# Patient Record
Sex: FEMALE | Race: WHITE | NOT HISPANIC OR LATINO | Employment: UNEMPLOYED | URBAN - NONMETROPOLITAN AREA
[De-identification: names, ages, dates, MRNs, and addresses within clinical notes are randomized per-mention and may not be internally consistent; named-entity substitution may affect disease eponyms.]

---

## 2017-07-10 LAB
EXTERNAL ABO GROUPING: NORMAL
EXTERNAL ANTIBODY SCREEN: NEGATIVE
EXTERNAL CHLAMYDIA SCREEN: NEGATIVE
EXTERNAL GONORRHEA SCREEN: NEGATIVE
EXTERNAL HEMATOCRIT: 33 %
EXTERNAL HEMOGLOBIN: 11.1 G/DL
EXTERNAL HEPATITIS B SURFACE ANTIGEN: NEGATIVE
EXTERNAL RH FACTOR: POSITIVE
EXTERNAL RUBELLA QUALITATIVE: NORMAL
EXTERNAL SYPHILIS RPR SCREEN: NORMAL
HIV1 P24 AG SERPL QL IA: NEGATIVE

## 2017-11-06 ENCOUNTER — HOSPITAL ENCOUNTER (INPATIENT)
Facility: HOSPITAL | Age: 27
LOS: 2 days | Discharge: HOME | End: 2017-11-08
Attending: OBSTETRICS & GYNECOLOGY | Admitting: OBSTETRICS & GYNECOLOGY

## 2017-11-06 DIAGNOSIS — Z34.93 PREGNANT AND NOT YET DELIVERED IN THIRD TRIMESTER: Primary | ICD-10-CM

## 2017-11-06 LAB
6-ACETYL MORPHINE: NEGATIVE
ABO GROUP BLD: NORMAL
AMPHET+METHAMPHET UR QL: NEGATIVE
BARBITURATES UR QL SCN: NEGATIVE
BENZODIAZ UR QL SCN: NEGATIVE
BLD GP AB SCN SERPL QL: NEGATIVE
BUPRENORPHINE SERPL-MCNC: POSITIVE NG/ML
CANNABINOIDS SERPL QL: NEGATIVE
COCAINE UR QL: NEGATIVE
DEPRECATED RDW RBC AUTO: 47.6 FL (ref 37–54)
ERYTHROCYTE [DISTWIDTH] IN BLOOD BY AUTOMATED COUNT: 15 % (ref 11.5–14.5)
EXTERNAL GROUP B STREP ANTIGEN: NORMAL
HCT VFR BLD AUTO: 32.2 % (ref 37–47)
HGB BLD-MCNC: 10.8 G/DL (ref 12–16)
MCH RBC QN AUTO: 30.2 PG (ref 27–33)
MCHC RBC AUTO-ENTMCNC: 33.5 G/DL (ref 33–37)
MCV RBC AUTO: 89.9 FL (ref 80–94)
METHADONE UR QL SCN: NEGATIVE
OPIATES UR QL: NEGATIVE
OXYCODONE UR QL SCN: NEGATIVE
PCP UR QL SCN: NEGATIVE
PLATELET # BLD AUTO: 363 10*3/MM3 (ref 130–400)
PMV BLD AUTO: 9.5 FL (ref 6–10)
RBC # BLD AUTO: 3.58 10*6/MM3 (ref 4.2–5.4)
RH BLD: POSITIVE
WBC NRBC COR # BLD: 13.21 10*3/MM3 (ref 4.5–12.5)

## 2017-11-06 PROCEDURE — 80307 DRUG TEST PRSMV CHEM ANLYZR: CPT | Performed by: OBSTETRICS & GYNECOLOGY

## 2017-11-06 PROCEDURE — 86850 RBC ANTIBODY SCREEN: CPT | Performed by: OBSTETRICS & GYNECOLOGY

## 2017-11-06 PROCEDURE — 86901 BLOOD TYPING SEROLOGIC RH(D): CPT | Performed by: OBSTETRICS & GYNECOLOGY

## 2017-11-06 PROCEDURE — G0463 HOSPITAL OUTPT CLINIC VISIT: HCPCS

## 2017-11-06 PROCEDURE — 59025 FETAL NON-STRESS TEST: CPT

## 2017-11-06 PROCEDURE — 86900 BLOOD TYPING SEROLOGIC ABO: CPT | Performed by: OBSTETRICS & GYNECOLOGY

## 2017-11-06 PROCEDURE — 25010000002 BUTORPHANOL PER 1 MG: Performed by: OBSTETRICS & GYNECOLOGY

## 2017-11-06 PROCEDURE — 25010000002 VANCOMYCIN PER 500 MG: Performed by: OBSTETRICS & GYNECOLOGY

## 2017-11-06 PROCEDURE — 85027 COMPLETE CBC AUTOMATED: CPT | Performed by: OBSTETRICS & GYNECOLOGY

## 2017-11-06 RX ORDER — BISACODYL 10 MG
10 SUPPOSITORY, RECTAL RECTAL DAILY PRN
Status: DISCONTINUED | OUTPATIENT
Start: 2017-11-07 | End: 2017-11-06

## 2017-11-06 RX ORDER — BUPRENORPHINE 2 MG/1
12 TABLET SUBLINGUAL DAILY
Status: CANCELLED | OUTPATIENT
Start: 2017-11-06

## 2017-11-06 RX ORDER — PRENATAL VIT NO.126/IRON/FOLIC 28MG-0.8MG
1 TABLET ORAL DAILY
COMMUNITY

## 2017-11-06 RX ORDER — ONDANSETRON 2 MG/ML
4 INJECTION INTRAMUSCULAR; INTRAVENOUS EVERY 6 HOURS PRN
Status: DISCONTINUED | OUTPATIENT
Start: 2017-11-06 | End: 2017-11-06

## 2017-11-06 RX ORDER — HYDROCODONE BITARTRATE AND ACETAMINOPHEN 5; 325 MG/1; MG/1
1 TABLET ORAL EVERY 4 HOURS PRN
Status: DISCONTINUED | OUTPATIENT
Start: 2017-11-06 | End: 2017-11-06 | Stop reason: SDUPTHER

## 2017-11-06 RX ORDER — HYDROCODONE BITARTRATE AND ACETAMINOPHEN 5; 325 MG/1; MG/1
1 TABLET ORAL EVERY 4 HOURS PRN
Status: DISCONTINUED | OUTPATIENT
Start: 2017-11-06 | End: 2017-11-08 | Stop reason: HOSPADM

## 2017-11-06 RX ORDER — IBUPROFEN 800 MG/1
800 TABLET ORAL EVERY 8 HOURS SCHEDULED
Status: DISCONTINUED | OUTPATIENT
Start: 2017-11-06 | End: 2017-11-06 | Stop reason: HOSPADM

## 2017-11-06 RX ORDER — ONDANSETRON 4 MG/1
4 TABLET, ORALLY DISINTEGRATING ORAL EVERY 6 HOURS PRN
Status: DISCONTINUED | OUTPATIENT
Start: 2017-11-06 | End: 2017-11-06 | Stop reason: HOSPADM

## 2017-11-06 RX ORDER — DOCUSATE SODIUM 100 MG/1
100 CAPSULE, LIQUID FILLED ORAL 2 TIMES DAILY
Status: DISCONTINUED | OUTPATIENT
Start: 2017-11-06 | End: 2017-11-06

## 2017-11-06 RX ORDER — SODIUM CHLORIDE 0.9 % (FLUSH) 0.9 %
1-10 SYRINGE (ML) INJECTION AS NEEDED
Status: DISCONTINUED | OUTPATIENT
Start: 2017-11-06 | End: 2017-11-08 | Stop reason: HOSPADM

## 2017-11-06 RX ORDER — LANOLIN 100 %
OINTMENT (GRAM) TOPICAL
Status: DISCONTINUED | OUTPATIENT
Start: 2017-11-06 | End: 2017-11-08 | Stop reason: HOSPADM

## 2017-11-06 RX ORDER — ONDANSETRON 2 MG/ML
4 INJECTION INTRAMUSCULAR; INTRAVENOUS EVERY 6 HOURS PRN
Status: DISCONTINUED | OUTPATIENT
Start: 2017-11-06 | End: 2017-11-06 | Stop reason: HOSPADM

## 2017-11-06 RX ORDER — ONDANSETRON 4 MG/1
4 TABLET, FILM COATED ORAL EVERY 6 HOURS PRN
Status: DISCONTINUED | OUTPATIENT
Start: 2017-11-06 | End: 2017-11-06 | Stop reason: HOSPADM

## 2017-11-06 RX ORDER — BUTORPHANOL TARTRATE 1 MG/ML
1 INJECTION, SOLUTION INTRAMUSCULAR; INTRAVENOUS
Status: DISCONTINUED | OUTPATIENT
Start: 2017-11-06 | End: 2017-11-06 | Stop reason: HOSPADM

## 2017-11-06 RX ORDER — PROMETHAZINE HYDROCHLORIDE 25 MG/1
12.5 TABLET ORAL EVERY 6 HOURS PRN
Status: DISCONTINUED | OUTPATIENT
Start: 2017-11-06 | End: 2017-11-06 | Stop reason: HOSPADM

## 2017-11-06 RX ORDER — TERBUTALINE SULFATE 1 MG/ML
0.25 INJECTION, SOLUTION SUBCUTANEOUS AS NEEDED
Status: DISCONTINUED | OUTPATIENT
Start: 2017-11-06 | End: 2017-11-06 | Stop reason: HOSPADM

## 2017-11-06 RX ORDER — ACETAMINOPHEN 325 MG/1
650 TABLET ORAL EVERY 4 HOURS PRN
Status: DISCONTINUED | OUTPATIENT
Start: 2017-11-06 | End: 2017-11-06 | Stop reason: HOSPADM

## 2017-11-06 RX ORDER — LORATADINE 10 MG/1
10 TABLET ORAL DAILY
COMMUNITY

## 2017-11-06 RX ORDER — CARBOPROST TROMETHAMINE 250 UG/ML
250 INJECTION, SOLUTION INTRAMUSCULAR AS NEEDED
Status: DISCONTINUED | OUTPATIENT
Start: 2017-11-06 | End: 2017-11-06 | Stop reason: HOSPADM

## 2017-11-06 RX ORDER — FERROUS SULFATE 325(65) MG
325 TABLET ORAL
Status: DISCONTINUED | OUTPATIENT
Start: 2017-11-07 | End: 2017-11-08 | Stop reason: HOSPADM

## 2017-11-06 RX ORDER — SODIUM CHLORIDE 0.9 % (FLUSH) 0.9 %
1-10 SYRINGE (ML) INJECTION AS NEEDED
Status: DISCONTINUED | OUTPATIENT
Start: 2017-11-06 | End: 2017-11-06

## 2017-11-06 RX ORDER — FERROUS SULFATE 325(65) MG
325 TABLET ORAL
Status: CANCELLED | OUTPATIENT
Start: 2017-11-06

## 2017-11-06 RX ORDER — BUPRENORPHINE 2 MG/1
12 TABLET SUBLINGUAL DAILY
Status: DISCONTINUED | OUTPATIENT
Start: 2017-11-06 | End: 2017-11-08 | Stop reason: HOSPADM

## 2017-11-06 RX ORDER — ZOLPIDEM TARTRATE 5 MG/1
5 TABLET ORAL NIGHTLY PRN
Status: DISCONTINUED | OUTPATIENT
Start: 2017-11-06 | End: 2017-11-06

## 2017-11-06 RX ORDER — CETIRIZINE HYDROCHLORIDE 10 MG/1
10 TABLET ORAL DAILY
Status: CANCELLED | OUTPATIENT
Start: 2017-11-06

## 2017-11-06 RX ORDER — BUPRENORPHINE 2 MG/1
3 TABLET SUBLINGUAL DAILY
Status: ON HOLD | COMMUNITY
End: 2017-11-06 | Stop reason: SDUPTHER

## 2017-11-06 RX ORDER — ONDANSETRON 4 MG/1
4 TABLET, FILM COATED ORAL EVERY 8 HOURS PRN
Status: DISCONTINUED | OUTPATIENT
Start: 2017-11-06 | End: 2017-11-08 | Stop reason: HOSPADM

## 2017-11-06 RX ORDER — DOCUSATE SODIUM 100 MG/1
100 CAPSULE, LIQUID FILLED ORAL 2 TIMES DAILY
Status: DISCONTINUED | OUTPATIENT
Start: 2017-11-06 | End: 2017-11-08 | Stop reason: HOSPADM

## 2017-11-06 RX ORDER — SODIUM CHLORIDE 0.9 % (FLUSH) 0.9 %
1-10 SYRINGE (ML) INJECTION AS NEEDED
Status: DISCONTINUED | OUTPATIENT
Start: 2017-11-06 | End: 2017-11-06 | Stop reason: HOSPADM

## 2017-11-06 RX ORDER — PRENATAL VIT/IRON FUM/FOLIC AC 27MG-0.8MG
1 TABLET ORAL DAILY
Status: DISCONTINUED | OUTPATIENT
Start: 2017-11-07 | End: 2017-11-08 | Stop reason: HOSPADM

## 2017-11-06 RX ORDER — PRENATAL VIT/IRON FUM/FOLIC AC 27MG-0.8MG
1 TABLET ORAL DAILY
Status: CANCELLED | OUTPATIENT
Start: 2017-11-06

## 2017-11-06 RX ORDER — BUPRENORPHINE HYDROCHLORIDE 8 MG/1
12 TABLET SUBLINGUAL DAILY
COMMUNITY

## 2017-11-06 RX ORDER — MISOPROSTOL 100 UG/1
800 TABLET ORAL AS NEEDED
Status: DISCONTINUED | OUTPATIENT
Start: 2017-11-06 | End: 2017-11-06 | Stop reason: HOSPADM

## 2017-11-06 RX ORDER — BISACODYL 10 MG
10 SUPPOSITORY, RECTAL RECTAL DAILY PRN
Status: DISCONTINUED | OUTPATIENT
Start: 2017-11-07 | End: 2017-11-08 | Stop reason: HOSPADM

## 2017-11-06 RX ORDER — BUPRENORPHINE 2 MG/1
3 TABLET SUBLINGUAL DAILY
Status: CANCELLED | OUTPATIENT
Start: 2017-11-06

## 2017-11-06 RX ORDER — OXYTOCIN/RINGER'S LACTATE 20/1000 ML
125 PLASTIC BAG, INJECTION (ML) INTRAVENOUS CONTINUOUS
Status: ACTIVE | OUTPATIENT
Start: 2017-11-06 | End: 2017-11-07

## 2017-11-06 RX ORDER — CETIRIZINE HYDROCHLORIDE 10 MG/1
10 TABLET ORAL NIGHTLY
Status: DISCONTINUED | OUTPATIENT
Start: 2017-11-06 | End: 2017-11-08 | Stop reason: HOSPADM

## 2017-11-06 RX ORDER — ZOLPIDEM TARTRATE 5 MG/1
5 TABLET ORAL NIGHTLY PRN
Status: DISCONTINUED | OUTPATIENT
Start: 2017-11-06 | End: 2017-11-08 | Stop reason: HOSPADM

## 2017-11-06 RX ORDER — IBUPROFEN 800 MG/1
800 TABLET ORAL EVERY 8 HOURS SCHEDULED
Status: DISCONTINUED | OUTPATIENT
Start: 2017-11-06 | End: 2017-11-08 | Stop reason: HOSPADM

## 2017-11-06 RX ORDER — ACETAMINOPHEN 325 MG/1
650 TABLET ORAL EVERY 4 HOURS PRN
Status: DISCONTINUED | OUTPATIENT
Start: 2017-11-06 | End: 2017-11-08 | Stop reason: HOSPADM

## 2017-11-06 RX ORDER — FERROUS SULFATE TAB EC 324 MG (65 MG FE EQUIVALENT) 324 (65 FE) MG
324 TABLET DELAYED RESPONSE ORAL
COMMUNITY

## 2017-11-06 RX ORDER — PROMETHAZINE HYDROCHLORIDE 12.5 MG/1
12.5 SUPPOSITORY RECTAL EVERY 6 HOURS PRN
Status: DISCONTINUED | OUTPATIENT
Start: 2017-11-06 | End: 2017-11-06 | Stop reason: HOSPADM

## 2017-11-06 RX ORDER — METHYLERGONOVINE MALEATE 0.2 MG/ML
200 INJECTION INTRAVENOUS ONCE AS NEEDED
Status: DISCONTINUED | OUTPATIENT
Start: 2017-11-06 | End: 2017-11-06 | Stop reason: HOSPADM

## 2017-11-06 RX ORDER — MAGNESIUM HYDROXIDE 1200 MG/15ML
1000 LIQUID ORAL ONCE AS NEEDED
Status: DISCONTINUED | OUTPATIENT
Start: 2017-11-06 | End: 2017-11-06 | Stop reason: HOSPADM

## 2017-11-06 RX ORDER — PROMETHAZINE HYDROCHLORIDE 25 MG/ML
12.5 INJECTION, SOLUTION INTRAMUSCULAR; INTRAVENOUS EVERY 6 HOURS PRN
Status: DISCONTINUED | OUTPATIENT
Start: 2017-11-06 | End: 2017-11-06 | Stop reason: HOSPADM

## 2017-11-06 RX ORDER — SODIUM CHLORIDE, SODIUM LACTATE, POTASSIUM CHLORIDE, CALCIUM CHLORIDE 600; 310; 30; 20 MG/100ML; MG/100ML; MG/100ML; MG/100ML
125 INJECTION, SOLUTION INTRAVENOUS CONTINUOUS
Status: DISCONTINUED | OUTPATIENT
Start: 2017-11-06 | End: 2017-11-08 | Stop reason: HOSPADM

## 2017-11-06 RX ORDER — OXYTOCIN/RINGER'S LACTATE 20/1000 ML
2-20 PLASTIC BAG, INJECTION (ML) INTRAVENOUS
Status: DISCONTINUED | OUTPATIENT
Start: 2017-11-06 | End: 2017-11-06 | Stop reason: HOSPADM

## 2017-11-06 RX ORDER — IBUPROFEN 600 MG/1
600 TABLET ORAL EVERY 8 HOURS PRN
Status: DISCONTINUED | OUTPATIENT
Start: 2017-11-06 | End: 2017-11-06

## 2017-11-06 RX ORDER — LIDOCAINE HYDROCHLORIDE 10 MG/ML
INJECTION, SOLUTION INFILTRATION; PERINEURAL
Status: DISCONTINUED
Start: 2017-11-06 | End: 2017-11-06 | Stop reason: WASHOUT

## 2017-11-06 RX ADMIN — HYDROCODONE BITARTRATE AND ACETAMINOPHEN 1 TABLET: 5; 325 TABLET ORAL at 15:16

## 2017-11-06 RX ADMIN — BUTORPHANOL TARTRATE 2 MG: 2 INJECTION, SOLUTION INTRAMUSCULAR; INTRAVENOUS at 07:33

## 2017-11-06 RX ADMIN — BUTORPHANOL TARTRATE 2 MG: 2 INJECTION, SOLUTION INTRAMUSCULAR; INTRAVENOUS at 09:52

## 2017-11-06 RX ADMIN — IBUPROFEN 600 MG: 600 TABLET ORAL at 15:16

## 2017-11-06 RX ADMIN — IBUPROFEN 800 MG: 800 TABLET ORAL at 21:31

## 2017-11-06 RX ADMIN — OXYTOCIN 2 MILLI-UNITS/MIN: 10 INJECTION INTRAVENOUS at 08:22

## 2017-11-06 RX ADMIN — BUPRENORPHINE HCL 12 MG: 2 TABLET SUBLINGUAL at 16:46

## 2017-11-06 RX ADMIN — DOCUSATE SODIUM 100 MG: 100 CAPSULE, LIQUID FILLED ORAL at 16:45

## 2017-11-06 NOTE — PLAN OF CARE
Problem: Patient Care Overview (Adult)  Goal: Plan of Care Review  Outcome: Ongoing (interventions implemented as appropriate)    11/06/17 0731   Coping/Psychosocial Response Interventions   Plan Of Care Reviewed With patient       Goal: Adult Individualization and Mutuality  Outcome: Ongoing (interventions implemented as appropriate)    Problem: Labor (Cervical Ripen, Induct, Augment) (Adult,Obstetrics,Pediatric)  Goal: Signs and Symptoms of Listed Potential Problems Will be Absent or Manageable (Labor)  Outcome: Ongoing (interventions implemented as appropriate)

## 2017-11-06 NOTE — H&P
HISTORY    Chief complaint  Ruptured membranes, post term pregnancy    History of present illness  This is a 27 year old patient of Dr. Krishna Chavira.  She has had 2 previous term vaginal deliveries.  FOB's name is Hal.  She is positive for hepatitis C.  She does wish to have tubal sterilization.  Last prenatal visit was August 7, 2017.  She was admitted to labor and delivery November 6 at 12:29 AM.  Her complaint was leaking of fluid.  Drug screen is positive.  She is at 40 weeks +6 days.    Past medical, surgical, obstetrical history  See attached prenatal record    Family history  See attached prenatal record    Social history  See attached prenatal record    Functional inquiry  See attached prenatal record                             PHYSICAL EXAMINATION    General  In no acute distress    HEENT  Throat and ears are clear, no masses or nodes were palpable    CVR  Heart sounds are normal with no murmurs, chest is clear to IPPA    GI/  Abdomen is soft with no masses tenderness or organomegaly.  Uterine size is commensurate with her dates.  Cervix is as noted elsewhere in chart    MS  No gross bone or joint abnormalities.                                        IMPRESSION/TREATMENT PLAN  I put on a fetal scalp electrode.  She is about 3-4 cm, 50%, -2.  Anticipate spontaneous vaginal delivery.

## 2017-11-06 NOTE — NURSING NOTE
I encouraged patient to allow me to attempt IV acces and explained the need for all different types of meds involved in routine labor care. She allowed me with success also allowed me to monitor her and vag exam. Modesta Singh RN

## 2017-11-06 NOTE — PLAN OF CARE
Problem: Patient Care Overview (Adult)  Goal: Plan of Care Review  Outcome: Ongoing (interventions implemented as appropriate)    11/06/17 0105   Coping/Psychosocial Response Interventions   Plan Of Care Reviewed With patient;spouse   Patient Care Overview   Progress progress towards functional goals is fair

## 2017-11-06 NOTE — NON STRESS TEST
Chely Rodríguez, a  at Unknown with an MADIHA of Not found., was seen at [facility] LABOR DELIVERY for a nonstress test.    Chief Complaint   Patient presents with   • Leaking of fluid       Interpretation A  Nonstress Test Interpretation A: Reactive (17 : Lulu Willard, RN)  Comments A: Verified by Flaquita Gould RN (17 : Lulu Willard, KARLY)

## 2017-11-06 NOTE — PLAN OF CARE
Problem: Patient Care Overview (Adult)  Goal: Adult Individualization and Mutuality  Outcome: Ongoing (interventions implemented as appropriate)    11/06/17 0105   Mutuality/Individual Preferences   What Information Would Help Us Give You More Personalized Care? pt has had a lack of prenatal care and is post dates   Individualization   Patient Specific Interventions IVF's, continuous monitoring, antibiotics for unknown GBS with PCN allergy

## 2017-11-06 NOTE — PLAN OF CARE
Problem: Labor (Cervical Ripen, Induct, Augment) (Adult,Obstetrics,Pediatric)  Goal: Signs and Symptoms of Listed Potential Problems Will be Absent or Manageable (Labor)  Outcome: Ongoing (interventions implemented as appropriate)    11/06/17 0104   Labor (Cervical Ripen, Induct, Augment)   Problems Assessed (Labor) all   Problems Present (Labor) pain

## 2017-11-06 NOTE — L&D DELIVERY NOTE
Vaginal Delivery Procedure Note    Chely Rodríguez  Gestational Age: <None> .        OBGYN: Dejuan Ward MD      Pre-op Diagnosis: Complete Dilation      Anesthesia: Epidual        Detailed Description of Procedure   This patient came to the hospital with a 40 week +6 day pregnancy, with ruptured membranes.  She was 4 cm.  We applied a fetal scalp electrode, and she went on to deliver a healthy baby over an intact perineum.  The placenta delivered intact. Father, mother, and baby are all stable in the birthing room at this time.         Maternal Blood Type: A   Amniotic Fluid Clear  Blood Cord: Yes  Estimated Blood Loss: * No surgery found *  Placenta: Spontaneous, Delivered Intact   Uterus Explored: Yes    Disposition: Transfer to Women's Health Floor  Condition: Stable    Dejuan Ward M.D     Date: 11/6/2017  Time: 12:25 PM

## 2017-11-06 NOTE — NURSING NOTE
Patient refusing efm at this time and continues to refuse IV risk and benefits explained again patient verbalizes understanding

## 2017-11-06 NOTE — NURSING NOTE
"Patient off of efm and toco so she can walk around and use the birthing ball, patient said she would \"think about\" having a power glide inserted today for IV access.   "

## 2017-11-06 NOTE — NURSING NOTE
Patient refusing IV at this time, Flaquita Gould RN witnessed conversation, risks and benefits explained to patient of need for IV for fluids, antibiotics for unknown GBS status, and prolonged ROM, patient continues to refuse IV at this time, Dr. Newberry aware

## 2017-11-07 LAB
BASOPHILS # BLD AUTO: 0.02 10*3/MM3 (ref 0–0.3)
BASOPHILS NFR BLD AUTO: 0.1 % (ref 0–2)
DEPRECATED RDW RBC AUTO: 48.1 FL (ref 37–54)
EOSINOPHIL # BLD AUTO: 0.1 10*3/MM3 (ref 0–0.7)
EOSINOPHIL NFR BLD AUTO: 0.6 % (ref 0–5)
ERYTHROCYTE [DISTWIDTH] IN BLOOD BY AUTOMATED COUNT: 15 % (ref 11.5–14.5)
HCT VFR BLD AUTO: 29.5 % (ref 37–47)
HGB BLD-MCNC: 9.6 G/DL (ref 12–16)
IMM GRANULOCYTES # BLD: 0.05 10*3/MM3 (ref 0–0.03)
IMM GRANULOCYTES NFR BLD: 0.3 % (ref 0–0.5)
LYMPHOCYTES # BLD AUTO: 4.85 10*3/MM3 (ref 1–3)
LYMPHOCYTES NFR BLD AUTO: 29.5 % (ref 21–51)
MCH RBC QN AUTO: 28.9 PG (ref 27–33)
MCHC RBC AUTO-ENTMCNC: 32.5 G/DL (ref 33–37)
MCV RBC AUTO: 88.9 FL (ref 80–94)
MONOCYTES # BLD AUTO: 1.41 10*3/MM3 (ref 0.1–0.9)
MONOCYTES NFR BLD AUTO: 8.6 % (ref 0–10)
NEUTROPHILS # BLD AUTO: 10.02 10*3/MM3 (ref 1.4–6.5)
NEUTROPHILS NFR BLD AUTO: 60.9 % (ref 30–70)
PLATELET # BLD AUTO: 341 10*3/MM3 (ref 130–400)
PMV BLD AUTO: 9.7 FL (ref 6–10)
RBC # BLD AUTO: 3.32 10*6/MM3 (ref 4.2–5.4)
WBC NRBC COR # BLD: 16.45 10*3/MM3 (ref 4.5–12.5)

## 2017-11-07 PROCEDURE — 85025 COMPLETE CBC W/AUTO DIFF WBC: CPT | Performed by: OBSTETRICS & GYNECOLOGY

## 2017-11-07 RX ADMIN — FERROUS SULFATE TAB 325 MG (65 MG ELEMENTAL FE) 325 MG: 325 (65 FE) TAB at 09:01

## 2017-11-07 RX ADMIN — PRENATAL VIT W/ FE FUMARATE-FA TAB 27-0.8 MG 1 TABLET: 27-0.8 TAB at 09:01

## 2017-11-07 RX ADMIN — IBUPROFEN 800 MG: 800 TABLET ORAL at 13:41

## 2017-11-07 RX ADMIN — IBUPROFEN 800 MG: 800 TABLET ORAL at 21:33

## 2017-11-07 RX ADMIN — IBUPROFEN 800 MG: 800 TABLET ORAL at 06:33

## 2017-11-07 RX ADMIN — DOCUSATE SODIUM 100 MG: 100 CAPSULE, LIQUID FILLED ORAL at 09:01

## 2017-11-07 RX ADMIN — BUPRENORPHINE HCL 12 MG: 2 TABLET SUBLINGUAL at 09:01

## 2017-11-07 RX ADMIN — DOCUSATE SODIUM 100 MG: 100 CAPSULE, LIQUID FILLED ORAL at 17:35

## 2017-11-07 NOTE — PLAN OF CARE
Problem: Patient Care Overview (Adult)  Goal: Plan of Care Review  Outcome: Ongoing (interventions implemented as appropriate)    11/06/17 0105 11/06/17 2000   Coping/Psychosocial Response Interventions   Plan Of Care Reviewed With --  patient   Patient Care Overview   Progress progress towards functional goals is fair --        Goal: Adult Individualization and Mutuality  Outcome: Ongoing (interventions implemented as appropriate)    Problem: Postpartum, Vaginal Delivery (Adult)  Goal: Signs and Symptoms of Listed Potential Problems Will be Absent or Manageable (Postpartum, Vaginal Delivery)  Outcome: Ongoing (interventions implemented as appropriate)    11/07/17 0633   Postpartum, Vaginal Delivery   Problems Assessed (Postpartum Vaginal Delivery) all   Problems Present (Postpartum Vaginal Delivery) none

## 2017-11-07 NOTE — PROGRESS NOTES
" [facility]  Vaginal Delivery Progress Note    Subjective   Subjective  Postpartum Day 1: Vaginal Delivery    The patient feels well.  Her pain is well controlled with nonsteroidal anti-inflammatory drugs.   She is ambulating well.  Patient describes her bleeding as moderate lochia.    Breastfeeding: declines.    Objective     Objective   Vital Signs Range for the last 24 hours  Temperature: Temp:  [97.3 °F (36.3 °C)-98.2 °F (36.8 °C)] 98 °F (36.7 °C)   Temp Source: Temp src: Oral   BP: BP: (110-142)/(47-85) 115/66   Pulse: Heart Rate:  [66-98] 98   Respirations: Resp:  [18] 18   Weight:       Admit Height:  Height: 60\" (152.4 cm)    Physical Exam:  General:  no acute distresss.  Abdomen: Fundus: appropriate, firm, non tender  Extremities: normal, atraumatic, no cyanosis, and trace edema.       [unfilled]       Lab Results   Component Value Date    ABO A 11/06/2017    RH Positive 11/06/2017        Lab Results   Component Value Date    HGB 9.6 (L) 11/07/2017    HCT 29.5 (L) 11/07/2017         Assessment/Plan   Assessment & Plan  Active Problems:    Pregnant and not yet delivered in third trimester      Chely Rodríguez is Day 1  post-partum  Vaginal, Spontaneous Delivery    .      Plan:  Continue current care.      Sharron Shelton, APRN  11/7/2017  8:45 AM    "

## 2017-11-07 NOTE — PROGRESS NOTES
"Case Management/Social Work    Patient Name:  Chely Rodríguez  YOB: 1990  MRN: [id number]  Admit Date:  11/6/2017    [facility] received consult \"mother hx drugs and in subutex program.\" Pt is 26 Y/O Chely Blanco who delivered viable baby girl on 11/6/17 weighing 7 lbs. 9.5 oz. Infant was named Margarita Charles \"Immanuel.\" FOB is Hal Gambino who is involved. Pt has two other children; Agapito Ramirez, age 6 and Trae Rodríguez, age 4. Pt states she has custody of both children. Pt lives at [address] [place] [address] in [place], [place]. Pt lives in the home with FOB and her children. Pt utilizes WIC and SNAP benefits. Pt does not utilize the HANDS Program. Pt to sign infant up to receive Medicaid. Infant care supplies available including the car seat.     Pt's UDS is positive for Buprenorphine. Infant's UDS is negative. Pt had a positive UDS on 7/10/17 and 8/7/17 for Methamphetamine and Buprenorphine. Pt was prescribed Buprenorphine by Dr. Earl at [facility] from 6/27/17 through 10/19/17. Pt denies using Methamphetamine and states it was Claritin-D. Infant's CordStat results are pending.     Pt had two prenatal visits; 7/10/17 and 8/7/17. Pt states she did not know she was pregnant until July and states she did not have transportation to her prenatal appointments.     [facility] contacted Central Intake per Melina and report was accepted for investigation. Intake ID# [id number]. [facility] to provide infant's discharge plan when available.     [facility] to provide transportation at discharge.     [facility] to be contacted with positive CordStat results or any other issues or concerns.     Electronically signed by:  Idania Maldonado  11/07/17 12:51 PM  "

## 2017-11-08 VITALS
HEIGHT: 60 IN | HEART RATE: 67 BPM | DIASTOLIC BLOOD PRESSURE: 75 MMHG | RESPIRATION RATE: 18 BRPM | OXYGEN SATURATION: 96 % | WEIGHT: 181 LBS | TEMPERATURE: 97.8 F | BODY MASS INDEX: 35.53 KG/M2 | SYSTOLIC BLOOD PRESSURE: 124 MMHG

## 2017-11-08 PROBLEM — Z34.93 PREGNANT AND NOT YET DELIVERED IN THIRD TRIMESTER: Status: RESOLVED | Noted: 2017-11-06 | Resolved: 2017-11-08

## 2017-11-08 RX ORDER — IBUPROFEN 800 MG/1
800 TABLET ORAL EVERY 8 HOURS SCHEDULED
Qty: 40 TABLET | Refills: 1 | Status: SHIPPED | OUTPATIENT
Start: 2017-11-08

## 2017-11-08 RX ADMIN — DOCUSATE SODIUM 100 MG: 100 CAPSULE, LIQUID FILLED ORAL at 08:27

## 2017-11-08 RX ADMIN — FERROUS SULFATE TAB 325 MG (65 MG ELEMENTAL FE) 325 MG: 325 (65 FE) TAB at 08:27

## 2017-11-08 RX ADMIN — BUPRENORPHINE HCL 12 MG: 2 TABLET SUBLINGUAL at 08:27

## 2017-11-08 RX ADMIN — IBUPROFEN 800 MG: 800 TABLET ORAL at 05:50

## 2017-11-08 RX ADMIN — PRENATAL VIT W/ FE FUMARATE-FA TAB 27-0.8 MG 1 TABLET: 27-0.8 TAB at 08:27

## 2017-11-08 RX ADMIN — HYDROCODONE BITARTRATE AND ACETAMINOPHEN 1 TABLET: 5; 325 TABLET ORAL at 00:36

## 2017-11-08 NOTE — PLAN OF CARE
Problem: Patient Care Overview (Adult)  Goal: Plan of Care Review  Outcome: Ongoing (interventions implemented as appropriate)    11/07/17 1134 11/07/17 2000   Coping/Psychosocial Response Interventions   Plan Of Care Reviewed With --  patient   Patient Care Overview   Progress improving --        Goal: Adult Individualization and Mutuality  Outcome: Ongoing (interventions implemented as appropriate)    Problem: Postpartum, Vaginal Delivery (Adult)  Goal: Signs and Symptoms of Listed Potential Problems Will be Absent or Manageable (Postpartum, Vaginal Delivery)  Outcome: Ongoing (interventions implemented as appropriate)    11/07/17 0633   Postpartum, Vaginal Delivery   Problems Assessed (Postpartum Vaginal Delivery) all   Problems Present (Postpartum Vaginal Delivery) none

## 2017-11-08 NOTE — PLAN OF CARE
Plan of Care Review Ongoing (interventions implemented as appropriate)      Adult Individualization and Mutuality Ongoing (interventions implemented as appropriate)      Discharge Needs Assessment Ongoing (interventions implemented as appropriate)      Signs and Symptoms of Listed Potential Problems Will be Absent or Manageable (Postpartum, Vaginal Delivery) Ongoing (interventions implemented as appropriate)

## 2017-11-08 NOTE — PROGRESS NOTES
" [facility]  Vaginal Delivery Progress Note    Subjective   Subjective  Postpartum Day 2: Vaginal Delivery    The patient feels well.  Her pain is well controlled with nonsteroidal anti-inflammatory drugs.   She is ambulating well.  Patient describes her bleeding as moderate lochia.    Breastfeeding: declines.    Objective     Objective   Vital Signs Range for the last 24 hours  Temperature: Temp:  [97.8 °F (36.6 °C)-97.9 °F (36.6 °C)] 97.8 °F (36.6 °C)   Temp Source: Temp src: Oral   BP: BP: (124-126)/(75-82) 124/75   Pulse: Heart Rate:  [67-82] 67   Respirations: Resp:  [18] 18   Weight:       Admit Height:  Height: 60\" (152.4 cm)    Physical Exam:  General:  no acute distresss.  Abdomen: Fundus: appropriate, firm, non tender  Extremities: normal, atraumatic, no cyanosis, and trace edema.       [unfilled]       Lab Results   Component Value Date    ABO A 11/06/2017    RH Positive 11/06/2017        Lab Results   Component Value Date    HGB 9.6 (L) 11/07/2017    HCT 29.5 (L) 11/07/2017         Assessment/Plan   Assessment & Plan  Active Problems:    Postpartum care following vaginal delivery      Chely Rodríguez is Day 2  post-partum  Vaginal, Spontaneous Delivery    .      Plan:  Continue current care. Baby on 5 day hold.  D/C home.      STUART Vyas  11/8/2017  9:49 AM    "

## 2017-11-08 NOTE — PAYOR COMM NOTE
"CONTACT:  EDY DELGADO RN, BSN  UTILIZATION MANAGEMENT DEPT.  [facility]  [address], [address]  PHONE:  [phone]  FAX: [phone]    PT D/C'D HOME 11/8/17. REFER TO AUTH # [id number]    Rubens Rodríguez (27 y.o. Female)     Date of Birth Social Security Number Address Home Phone MRN    1990  [id number] [address] [phone] [id number]    [employer] Marital Status          Unknown Single       Admission Date Admission Type Admitting Provider Attending Provider Department, Room/Bed    11/6/17 Elective Nandini Newberry,   [facility], W237/1    Discharge Date Discharge Disposition Discharge Destination        11/8/2017 Home or Self Care             Attending Provider: (none)    Allergies:  Bee Venom, Penicillins    Isolation:  None   Infection:  None   Code Status:  FULL    Ht:  60\" (152.4 cm)   Wt:  181 lb (82.1 kg)    Admission Cmt:  None   Principal Problem:  None                Active Insurance as of 11/6/2017     Primary Coverage     Payor Plan Insurance Group Employer/Plan Group    [employer] [place] [employer] [place]ICA[place]      Payor Plan Address Payor Plan Phone Number Effective From Effective To    [address] [address] [phone] 11/6/2017     [place] [address]       Subscriber Name Subscriber Birth Date Member ID       RUBENS RODRÍGUEZ 1990 [id number]                 Emergency Contacts      (Rel.) Home Phone Work Phone Mobile Phone    Caridad Underwood (Relative) [phone] -- --               Discharge Summary      STUART Vyas at 11/8/2017  9:34 AM           [facility]  Delivery Discharge Summary    Primary OB Clinician:     EDC: Estimated Date of Delivery: 10/31/17    Gestational Age:40w6d    Antepartum complications: + UDS methamphetamine and suboxone therapy    Date of Delivery: 11/6/2017   Time of Delivery: 12:19 PM     Delivered By:  Dejuan Ward     Delivery Type: Vaginal, Spontaneous Delivery  "     Tubal Ligation: n/a    Baby:Female  Apgar:  8   @ 1 minute /   Apgar:  9   @ 5 minutes      Anesthesia: None      Intrapartum complications: None    Laceration: no    Episiotomy: No    Placenta: Spontaneous     Feeding method: Bottlefeeding    [unfilled]       Lab Results   Component Value Date    ABO A 2017    RH Positive 2017        Lab Results   Component Value Date    HGB 9.6 (L) 2017    HCT 29.5 (L) 2017       Rh Immune globulin given: not applicable      Discharge Date: 2017; Discharge Time: 9:35 AM        Plan:    Address and phone number verified and same.  Follow-up appointment with EA in 3 weeks.      STUART Vyas  2017  9:35 AM     Electronically signed by STUART Vyas at 2017  9:36 AM

## 2017-11-08 NOTE — DISCHARGE SUMMARY
JENNIE Cheng  Delivery Discharge Summary    Primary OB Clinician:     EDC: Estimated Date of Delivery: 10/31/17    Gestational Age:40w6d    Antepartum complications: + UDS methamphetamine and suboxone therapy    Date of Delivery: 2017   Time of Delivery: 12:19 PM     Delivered By:  Dejuan Ward     Delivery Type: Vaginal, Spontaneous Delivery      Tubal Ligation: n/a    Baby:Female  Apgar:  8   @ 1 minute /   Apgar:  9   @ 5 minutes      Anesthesia: None      Intrapartum complications: None    Laceration: no    Episiotomy: No    Placenta: Spontaneous     Feeding method: Bottlefeeding    [unfilled]       Lab Results   Component Value Date    ABO A 2017    RH Positive 2017        Lab Results   Component Value Date    HGB 9.6 (L) 2017    HCT 29.5 (L) 2017       Rh Immune globulin given: not applicable      Discharge Date: 2017; Discharge Time: 9:35 AM        Plan:    Address and phone number verified and same.  Follow-up appointment with EA in 3 weeks.      STUART Vyas  2017  9:35 AM

## 2019-03-25 ENCOUNTER — TRANSCRIBE ORDERS (OUTPATIENT)
Dept: ADMINISTRATIVE | Facility: HOSPITAL | Age: 29
End: 2019-03-25

## 2019-03-25 ENCOUNTER — APPOINTMENT (OUTPATIENT)
Dept: LAB | Facility: HOSPITAL | Age: 29
End: 2019-03-25

## 2019-03-25 DIAGNOSIS — N91.2 ABSENCE OF MENSTRUATION: Primary | ICD-10-CM

## 2019-03-25 PROCEDURE — 36415 COLL VENOUS BLD VENIPUNCTURE: CPT

## 2019-03-25 PROCEDURE — 84702 CHORIONIC GONADOTROPIN TEST: CPT

## 2019-03-26 LAB — HCG INTACT+B SERPL-ACNC: <0.5 MIU/ML

## 2020-01-26 ENCOUNTER — TRANSCRIBE ORDERS (OUTPATIENT)
Dept: ADMINISTRATIVE | Facility: HOSPITAL | Age: 30
End: 2020-01-26

## 2020-01-26 ENCOUNTER — HOSPITAL ENCOUNTER (OUTPATIENT)
Dept: GENERAL RADIOLOGY | Facility: HOSPITAL | Age: 30
Discharge: HOME | End: 2020-01-26

## 2020-01-26 ENCOUNTER — HOSPITAL ENCOUNTER (OUTPATIENT)
Dept: GENERAL RADIOLOGY | Facility: HOSPITAL | Age: 30
Discharge: HOME | End: 2020-01-26
Admitting: CHIROPRACTOR

## 2020-01-26 DIAGNOSIS — M25.559 ARTHRALGIA OF HIP, UNSPECIFIED LATERALITY: Primary | ICD-10-CM

## 2020-01-26 PROCEDURE — 72110 X-RAY EXAM L-2 SPINE 4/>VWS: CPT

## 2020-01-26 PROCEDURE — 72170 X-RAY EXAM OF PELVIS: CPT

## 2020-01-26 PROCEDURE — 72170 X-RAY EXAM OF PELVIS: CPT | Performed by: RADIOLOGY

## 2020-01-26 PROCEDURE — 72110 X-RAY EXAM L-2 SPINE 4/>VWS: CPT | Performed by: RADIOLOGY

## 2022-11-16 ENCOUNTER — HOSPITAL ENCOUNTER (EMERGENCY)
Facility: HOSPITAL | Age: 32
Discharge: HOME | End: 2022-11-17
Attending: STUDENT IN AN ORGANIZED HEALTH CARE EDUCATION/TRAINING PROGRAM | Admitting: STUDENT IN AN ORGANIZED HEALTH CARE EDUCATION/TRAINING PROGRAM

## 2022-11-16 ENCOUNTER — APPOINTMENT (OUTPATIENT)
Dept: CT IMAGING | Facility: HOSPITAL | Age: 32
End: 2022-11-16

## 2022-11-16 DIAGNOSIS — R10.9 CHRONIC ABDOMINAL PAIN: Primary | ICD-10-CM

## 2022-11-16 DIAGNOSIS — G89.29 CHRONIC ABDOMINAL PAIN: Primary | ICD-10-CM

## 2022-11-16 LAB
ALBUMIN SERPL-MCNC: 4.23 G/DL (ref 3.5–5.2)
ALBUMIN/GLOB SERPL: 1.4 G/DL
ALP SERPL-CCNC: 62 U/L (ref 39–117)
ALT SERPL W P-5'-P-CCNC: 42 U/L (ref 1–33)
ANION GAP SERPL CALCULATED.3IONS-SCNC: 13.2 MMOL/L (ref 5–15)
AST SERPL-CCNC: 31 U/L (ref 1–32)
BACTERIA UR QL AUTO: ABNORMAL /HPF
BASOPHILS # BLD AUTO: 0.04 10*3/MM3 (ref 0–0.2)
BASOPHILS NFR BLD AUTO: 0.4 % (ref 0–1.5)
BILIRUB SERPL-MCNC: <0.2 MG/DL (ref 0–1.2)
BILIRUB UR QL STRIP: NEGATIVE
BUN SERPL-MCNC: 10 MG/DL (ref 6–20)
BUN/CREAT SERPL: 13.3 (ref 7–25)
CALCIUM SPEC-SCNC: 9.3 MG/DL (ref 8.6–10.5)
CHLORIDE SERPL-SCNC: 106 MMOL/L (ref 98–107)
CLARITY UR: ABNORMAL
CO2 SERPL-SCNC: 21.8 MMOL/L (ref 22–29)
COLOR UR: YELLOW
CREAT SERPL-MCNC: 0.75 MG/DL (ref 0.57–1)
CRP SERPL-MCNC: <0.3 MG/DL (ref 0–0.5)
DEPRECATED RDW RBC AUTO: 42.5 FL (ref 37–54)
EGFRCR SERPLBLD CKD-EPI 2021: 107.3 ML/MIN/1.73
EOSINOPHIL # BLD AUTO: 0.11 10*3/MM3 (ref 0–0.4)
EOSINOPHIL NFR BLD AUTO: 1.2 % (ref 0.3–6.2)
ERYTHROCYTE [DISTWIDTH] IN BLOOD BY AUTOMATED COUNT: 12.4 % (ref 12.3–15.4)
ERYTHROCYTE [SEDIMENTATION RATE] IN BLOOD: 7 MM/HR (ref 0–20)
GLOBULIN UR ELPH-MCNC: 3 GM/DL
GLUCOSE SERPL-MCNC: 85 MG/DL (ref 65–99)
GLUCOSE UR STRIP-MCNC: NEGATIVE MG/DL
HCG SERPL QL: NEGATIVE
HCT VFR BLD AUTO: 42.3 % (ref 34–46.6)
HGB BLD-MCNC: 13.9 G/DL (ref 12–15.9)
HGB UR QL STRIP.AUTO: ABNORMAL
HYALINE CASTS UR QL AUTO: ABNORMAL /LPF
IMM GRANULOCYTES # BLD AUTO: 0.03 10*3/MM3 (ref 0–0.05)
IMM GRANULOCYTES NFR BLD AUTO: 0.3 % (ref 0–0.5)
KETONES UR QL STRIP: NEGATIVE
LEUKOCYTE ESTERASE UR QL STRIP.AUTO: ABNORMAL
LYMPHOCYTES # BLD AUTO: 3.31 10*3/MM3 (ref 0.7–3.1)
LYMPHOCYTES NFR BLD AUTO: 34.7 % (ref 19.6–45.3)
MCH RBC QN AUTO: 30.6 PG (ref 26.6–33)
MCHC RBC AUTO-ENTMCNC: 32.9 G/DL (ref 31.5–35.7)
MCV RBC AUTO: 93.2 FL (ref 79–97)
MONOCYTES # BLD AUTO: 0.74 10*3/MM3 (ref 0.1–0.9)
MONOCYTES NFR BLD AUTO: 7.8 % (ref 5–12)
NEUTROPHILS NFR BLD AUTO: 5.3 10*3/MM3 (ref 1.7–7)
NEUTROPHILS NFR BLD AUTO: 55.6 % (ref 42.7–76)
NITRITE UR QL STRIP: NEGATIVE
NRBC BLD AUTO-RTO: 0 /100 WBC (ref 0–0.2)
PH UR STRIP.AUTO: 7 [PH] (ref 5–8)
PLATELET # BLD AUTO: 299 10*3/MM3 (ref 140–450)
PMV BLD AUTO: 10.2 FL (ref 6–12)
POTASSIUM SERPL-SCNC: 3.9 MMOL/L (ref 3.5–5.2)
PROT SERPL-MCNC: 7.2 G/DL (ref 6–8.5)
PROT UR QL STRIP: NEGATIVE
RBC # BLD AUTO: 4.54 10*6/MM3 (ref 3.77–5.28)
RBC # UR STRIP: ABNORMAL /HPF
REF LAB TEST METHOD: ABNORMAL
SODIUM SERPL-SCNC: 141 MMOL/L (ref 136–145)
SP GR UR STRIP: 1.01 (ref 1–1.03)
SQUAMOUS #/AREA URNS HPF: ABNORMAL /HPF
UROBILINOGEN UR QL STRIP: ABNORMAL
WBC # UR STRIP: ABNORMAL /HPF
WBC NRBC COR # BLD: 9.53 10*3/MM3 (ref 3.4–10.8)
WHOLE BLOOD HOLD SPECIMEN: NORMAL

## 2022-11-16 PROCEDURE — 86850 RBC ANTIBODY SCREEN: CPT | Performed by: PHYSICIAN ASSISTANT

## 2022-11-16 PROCEDURE — 74177 CT ABD & PELVIS W/CONTRAST: CPT

## 2022-11-16 PROCEDURE — 81001 URINALYSIS AUTO W/SCOPE: CPT | Performed by: PHYSICIAN ASSISTANT

## 2022-11-16 PROCEDURE — 86900 BLOOD TYPING SEROLOGIC ABO: CPT | Performed by: PHYSICIAN ASSISTANT

## 2022-11-16 PROCEDURE — 86140 C-REACTIVE PROTEIN: CPT | Performed by: PHYSICIAN ASSISTANT

## 2022-11-16 PROCEDURE — 36415 COLL VENOUS BLD VENIPUNCTURE: CPT

## 2022-11-16 PROCEDURE — 80053 COMPREHEN METABOLIC PANEL: CPT | Performed by: PHYSICIAN ASSISTANT

## 2022-11-16 PROCEDURE — 85025 COMPLETE CBC W/AUTO DIFF WBC: CPT | Performed by: PHYSICIAN ASSISTANT

## 2022-11-16 PROCEDURE — 84703 CHORIONIC GONADOTROPIN ASSAY: CPT | Performed by: PHYSICIAN ASSISTANT

## 2022-11-16 PROCEDURE — 86901 BLOOD TYPING SEROLOGIC RH(D): CPT | Performed by: PHYSICIAN ASSISTANT

## 2022-11-16 PROCEDURE — 85652 RBC SED RATE AUTOMATED: CPT | Performed by: PHYSICIAN ASSISTANT

## 2022-11-16 PROCEDURE — 99283 EMERGENCY DEPT VISIT LOW MDM: CPT

## 2022-11-16 NOTE — ED NOTES
MEDICAL SCREENING:    Reason for Visit: Right-sided predominantly lower abdominal pain over the past 2 weeks with some blood in her stool.    Patient initially seen in triage.  The patient was advised further evaluation and diagnostic testing will be needed, some of the treatment and testing will be initiated in the lobby in order to begin the process.  The patient will be returned to the waiting area for the time being and possibly be re-assessed by a subsequent ED provider.  The patient will be brought back to the treatment area in as timely manner as possible.         Gus Jennings PA  11/16/22 1736

## 2022-11-17 VITALS
RESPIRATION RATE: 20 BRPM | WEIGHT: 245 LBS | TEMPERATURE: 98.4 F | BODY MASS INDEX: 46.26 KG/M2 | DIASTOLIC BLOOD PRESSURE: 86 MMHG | OXYGEN SATURATION: 100 % | HEIGHT: 61 IN | SYSTOLIC BLOOD PRESSURE: 93 MMHG | HEART RATE: 86 BPM

## 2022-11-17 LAB
ABO GROUP BLD: NORMAL
BLD GP AB SCN SERPL QL: NEGATIVE
HOLD SPECIMEN: NORMAL
RH BLD: POSITIVE
T&S EXPIRATION DATE: NORMAL
WHOLE BLOOD HOLD COAG: NORMAL

## 2022-11-17 PROCEDURE — 25010000002 IOPAMIDOL 61 % SOLUTION: Performed by: STUDENT IN AN ORGANIZED HEALTH CARE EDUCATION/TRAINING PROGRAM

## 2022-11-17 RX ORDER — ONDANSETRON 4 MG/1
4 TABLET, ORALLY DISINTEGRATING ORAL EVERY 8 HOURS PRN
Qty: 15 TABLET | Refills: 0 | Status: SHIPPED | OUTPATIENT
Start: 2022-11-17

## 2022-11-17 RX ADMIN — IOPAMIDOL 80 ML: 612 INJECTION, SOLUTION INTRAVENOUS at 00:18

## 2022-11-17 NOTE — DISCHARGE INSTRUCTIONS
Please follow up with your primary care physician in 1-2 days you have also been provided referral to Gastroenterology for your ongoing abdominal pain. Return for any worsening symptoms.

## 2022-11-17 NOTE — ED PROVIDER NOTES
Subjective   History of Present Illness     Ms. Mo is a 32-year-old female past medical history for polysubstance abuse presenting to the emergency department with lower abdominal pain. Patient reports chronic abdominal pain for 3 to 4 weeks however she has been incarcerated and been unable to seek medical assistance per the patient. Patient reports the pain starts in LLQ and radiates toward right side of her abdomen. She also reports bloody bright stools. She  Reports nausea but denies emesis. No diarrhea or constipation. No excessive straining. No fevers or other infectious like symptoms.     Review of Systems   Constitutional: Negative.  Negative for fever.   HENT: Negative.    Respiratory: Negative.    Cardiovascular: Negative.  Negative for chest pain.   Gastrointestinal: Positive for abdominal pain.   Endocrine: Negative.    Genitourinary: Negative.  Negative for dysuria.        Bloody stool    Skin: Negative.    Neurological: Negative.    Psychiatric/Behavioral: Negative.    All other systems reviewed and are negative.      Past Medical History:   Diagnosis Date   • Hepatitis C    • Substance abuse (HCC)        Allergies   Allergen Reactions   • Bee Venom Anaphylaxis   • Penicillins Anaphylaxis     Throat swelling         No past surgical history on file.    Family History   Problem Relation Age of Onset   • Coronary artery disease Father    • Diabetes Father    • Cervical cancer Mother    • SIDS Sister    • Pancreatitis Daughter        Social History     Socioeconomic History   • Marital status: Single   Tobacco Use   • Smoking status: Every Day   • Smokeless tobacco: Former   Substance and Sexual Activity   • Alcohol use: No   • Drug use: No     Comment: HX of currently on subutex   • Sexual activity: Yes     Partners: Male           Objective   Physical Exam  Vitals and nursing note reviewed.   HENT:      Head: Normocephalic and atraumatic.      Mouth/Throat:      Mouth: Mucous membranes are moist.       Pharynx: No pharyngeal swelling.   Eyes:      Extraocular Movements: Extraocular movements intact.      Pupils: Pupils are equal, round, and reactive to light.   Cardiovascular:      Rate and Rhythm: Normal rate and regular rhythm.      Heart sounds: Normal heart sounds. No murmur heard.    No friction rub.   Abdominal:      General: Bowel sounds are normal.      Palpations: Abdomen is soft.      Tenderness: There is no abdominal tenderness. There is no right CVA tenderness or left CVA tenderness.   Skin:     General: Skin is warm.      Capillary Refill: Capillary refill takes less than 2 seconds.      Coloration: Skin is not cyanotic or jaundiced.   Neurological:      General: No focal deficit present.      Mental Status: She is alert and oriented to person, place, and time.         Procedures           ED Course                                           MDM  Number of Diagnoses or Management Options     Amount and/or Complexity of Data Reviewed  Clinical lab tests: reviewed and ordered  Tests in the radiology section of CPT®: reviewed and ordered  Review and summarize past medical records: yes  Independent visualization of images, tracings, or specimens: yes        Final diagnoses:   Chronic abdominal pain       ED Disposition  ED Disposition     ED Disposition   Discharge    Condition   Stable    Comment   --             Amanda Stark, APRN  [address]  [facility] [address]  [phone]    Go in 2 days  Medical evaluation    Baljit Jean-Baptiste PA-C  [address] [facility]  [address]  [facility] [address]  [phone]    Go in 2 days           Medication List      New Prescriptions    ondansetron ODT 4 MG disintegrating tablet  Commonly known as: ZOFRAN-ODT  Place 1 tablet on the tongue Every 8 (Eight) Hours As Needed for Nausea or Vomiting.           Where to Get Your Medications      These medications were sent to [place], [place] - [address] - [phone] [facility] [phone] FX  [address],  [facility] [address]    Phone: [phone]   · ondansetron ODT 4 MG disintegrating tablet          Azeb Mariano MD  11/17/22 2511

## 2023-01-08 ENCOUNTER — APPOINTMENT (OUTPATIENT)
Dept: GENERAL RADIOLOGY | Facility: HOSPITAL | Age: 33
End: 2023-01-08
Payer: COMMERCIAL

## 2023-01-08 ENCOUNTER — HOSPITAL ENCOUNTER (EMERGENCY)
Facility: HOSPITAL | Age: 33
Discharge: HOME | End: 2023-01-08
Attending: STUDENT IN AN ORGANIZED HEALTH CARE EDUCATION/TRAINING PROGRAM | Admitting: STUDENT IN AN ORGANIZED HEALTH CARE EDUCATION/TRAINING PROGRAM
Payer: COMMERCIAL

## 2023-01-08 VITALS
OXYGEN SATURATION: 98 % | BODY MASS INDEX: 53.01 KG/M2 | HEART RATE: 80 BPM | HEIGHT: 60 IN | RESPIRATION RATE: 18 BRPM | TEMPERATURE: 97.9 F | SYSTOLIC BLOOD PRESSURE: 110 MMHG | DIASTOLIC BLOOD PRESSURE: 78 MMHG | WEIGHT: 270 LBS

## 2023-01-08 DIAGNOSIS — S92.334A CLOSED NONDISPLACED FRACTURE OF THIRD METATARSAL BONE OF RIGHT FOOT, INITIAL ENCOUNTER: Primary | ICD-10-CM

## 2023-01-08 PROCEDURE — 73630 X-RAY EXAM OF FOOT: CPT

## 2023-01-08 PROCEDURE — 99283 EMERGENCY DEPT VISIT LOW MDM: CPT

## 2023-01-08 PROCEDURE — 73630 X-RAY EXAM OF FOOT: CPT | Performed by: RADIOLOGY

## 2023-01-08 NOTE — ED PROVIDER NOTES
Subjective   History of Present Illness  32 year old female with past medical hx of hepatitis C and substance abuse presents to the ED with right foot pain after stepping off a curb. Patient denies any previous right foot injuries. Aggravating factors include bearing weight and movement. Denies any other injuries, complaints, or concerns at this time.    History provided by:  Patient   used: No        Review of Systems   Constitutional: Negative.  Negative for fever.   HENT: Negative.    Respiratory: Negative.    Cardiovascular: Negative.  Negative for chest pain.   Gastrointestinal: Negative.  Negative for abdominal pain.   Endocrine: Negative.    Genitourinary: Negative.  Negative for dysuria.   Musculoskeletal:        (+) right foot pain   Skin: Negative.    Neurological: Negative.    Psychiatric/Behavioral: Negative.    All other systems reviewed and are negative.      Past Medical History:   Diagnosis Date   • Hepatitis C    • Substance abuse (HCC)        Allergies   Allergen Reactions   • Bee Venom Anaphylaxis   • Penicillins Anaphylaxis     Throat swelling         No past surgical history on file.    Family History   Problem Relation Age of Onset   • Coronary artery disease Father    • Diabetes Father    • Cervical cancer Mother    • SIDS Sister    • Pancreatitis Daughter        Social History     Socioeconomic History   • Marital status: Single   Tobacco Use   • Smoking status: Every Day   • Smokeless tobacco: Former   Substance and Sexual Activity   • Alcohol use: No   • Drug use: No     Comment: HX of currently on subutex   • Sexual activity: Yes     Partners: Male           Objective   Physical Exam  Vitals and nursing note reviewed.   Constitutional:       General: She is not in acute distress.     Appearance: She is well-developed. She is not diaphoretic.   HENT:      Head: Normocephalic and atraumatic.      Right Ear: External ear normal.      Left Ear: External ear normal.       Nose: Nose normal.   Eyes:      Conjunctiva/sclera: Conjunctivae normal.      Pupils: Pupils are equal, round, and reactive to light.   Neck:      Vascular: No JVD.      Trachea: No tracheal deviation.   Cardiovascular:      Rate and Rhythm: Normal rate and regular rhythm.      Heart sounds: Normal heart sounds. No murmur heard.  Pulmonary:      Effort: Pulmonary effort is normal. No respiratory distress.      Breath sounds: Normal breath sounds. No wheezing.   Abdominal:      General: Bowel sounds are normal.      Palpations: Abdomen is soft.      Tenderness: There is no abdominal tenderness.   Musculoskeletal:         General: No deformity. Normal range of motion.      Cervical back: Normal range of motion and neck supple.        Feet:    Skin:     General: Skin is warm and dry.      Coloration: Skin is not pale.      Findings: No erythema or rash.   Neurological:      Mental Status: She is alert and oriented to person, place, and time.      Cranial Nerves: No cranial nerve deficit.   Psychiatric:         Behavior: Behavior normal.         Thought Content: Thought content normal.         Splint - Cast - Strapping    Date/Time: 1/8/2023 7:15 PM  Performed by: Wendy Griffin PA-C  Authorized by: Azeb Mariano MD     Consent:     Consent obtained:  Verbal    Consent given by:  Patient    Risks, benefits, and alternatives were discussed: yes      Risks discussed:  Discoloration, numbness, pain and swelling    Alternatives discussed:  Referral, no treatment, delayed treatment, alternative treatment and observation  Universal protocol:     Procedure explained and questions answered to patient or proxy's satisfaction: yes      Relevant documents present and verified: yes      Test results available: yes      Imaging studies available: yes      Required blood products, implants, devices, and special equipment available: yes      Site/side marked: yes      Immediately prior to procedure a time out was called:  yes      Patient identity confirmed:  Verbally with patient, arm band, provided demographic data and hospital-assigned identification number  Pre-procedure details:     Distal neurologic exam:  Normal    Distal perfusion: distal pulses strong    Procedure details:     Location:  Foot    Foot location:  R foot    Cast type:  Short leg walking    Supplies:  Prefabricated splint    Splint applied and adjusted personally by me: Splint applied by tech, checked by me.    Post-procedure details:     Distal neurologic exam:  Normal    Distal perfusion: distal pulses strong      Procedure completion:  Tolerated well, no immediate complications    Post-procedure imaging: not applicable    Comments:      Patient tolerated splint application well, per tech. No acute complications. Neurovascularly intact.                 ED Course  ED Course as of 01/08/23 1918   Sun Jan 08, 2023   1426 XR Foot 3+ View Right [TK]      ED Course User Index  [TK] Wendy Griffin PA-C                                           Medical Decision Making  32 year old female with past medical hx of hepatitis C and substance abuse presents to the ED with right foot pain after stepping off a curb. Patient denies any previous right foot injuries. Aggravating factors include bearing weight and movement. Denies any other injuries, complaints, or concerns at this time.    Pt imaging with non-displaced fracture of third metatarsal bone. Pt placed in walking boot with crutches. She will follow up with orthopedics.    Closed nondisplaced fracture of third metatarsal bone of right foot, initial encounter: acute illness or injury  Amount and/or Complexity of Data Reviewed  Radiology: ordered. Decision-making details documented in ED Course.          Final diagnoses:   Closed nondisplaced fracture of third metatarsal bone of right foot, initial encounter       ED Disposition  ED Disposition     ED Disposition   Discharge    Condition   Stable    Comment   --              Caden Dukes, DO  [address] [place] [address] [phone]    In 2 days           Medication List      No changes were made to your prescriptions during this visit.          Wendy Griffin PA-C  01/08/23 1918

## 2023-10-16 ENCOUNTER — APPOINTMENT (OUTPATIENT)
Dept: GENERAL RADIOLOGY | Facility: HOSPITAL | Age: 33
End: 2023-10-16
Payer: COMMERCIAL

## 2023-10-16 ENCOUNTER — HOSPITAL ENCOUNTER (EMERGENCY)
Facility: HOSPITAL | Age: 33
Discharge: HOME | End: 2023-10-16
Attending: STUDENT IN AN ORGANIZED HEALTH CARE EDUCATION/TRAINING PROGRAM | Admitting: STUDENT IN AN ORGANIZED HEALTH CARE EDUCATION/TRAINING PROGRAM
Payer: COMMERCIAL

## 2023-10-16 VITALS
HEIGHT: 60 IN | RESPIRATION RATE: 20 BRPM | TEMPERATURE: 98.1 F | DIASTOLIC BLOOD PRESSURE: 81 MMHG | WEIGHT: 230 LBS | HEART RATE: 98 BPM | BODY MASS INDEX: 45.16 KG/M2 | OXYGEN SATURATION: 100 % | SYSTOLIC BLOOD PRESSURE: 131 MMHG

## 2023-10-16 DIAGNOSIS — J10.1 INFLUENZA A: Primary | ICD-10-CM

## 2023-10-16 LAB
ALBUMIN SERPL-MCNC: 3.9 G/DL (ref 3.5–5.2)
ALBUMIN/GLOB SERPL: 1.4 G/DL
ALP SERPL-CCNC: 66 U/L (ref 39–117)
ALT SERPL W P-5'-P-CCNC: 43 U/L (ref 1–33)
ANION GAP SERPL CALCULATED.3IONS-SCNC: 10.6 MMOL/L (ref 5–15)
AST SERPL-CCNC: 20 U/L (ref 1–32)
BASOPHILS # BLD AUTO: 0.03 10*3/MM3 (ref 0–0.2)
BASOPHILS NFR BLD AUTO: 0.3 % (ref 0–1.5)
BILIRUB SERPL-MCNC: 0.2 MG/DL (ref 0–1.2)
BUN SERPL-MCNC: 6 MG/DL (ref 6–20)
BUN/CREAT SERPL: 10 (ref 7–25)
CALCIUM SPEC-SCNC: 8.7 MG/DL (ref 8.6–10.5)
CHLORIDE SERPL-SCNC: 106 MMOL/L (ref 98–107)
CO2 SERPL-SCNC: 20.4 MMOL/L (ref 22–29)
CREAT SERPL-MCNC: 0.6 MG/DL (ref 0.57–1)
CRP SERPL-MCNC: 2.04 MG/DL (ref 0–0.5)
DEPRECATED RDW RBC AUTO: 44.8 FL (ref 37–54)
EGFRCR SERPLBLD CKD-EPI 2021: 120.2 ML/MIN/1.73
EOSINOPHIL # BLD AUTO: 0.01 10*3/MM3 (ref 0–0.4)
EOSINOPHIL NFR BLD AUTO: 0.1 % (ref 0.3–6.2)
ERYTHROCYTE [DISTWIDTH] IN BLOOD BY AUTOMATED COUNT: 13.3 % (ref 12.3–15.4)
FLUAV RNA RESP QL NAA+PROBE: DETECTED
FLUBV RNA RESP QL NAA+PROBE: NOT DETECTED
GLOBULIN UR ELPH-MCNC: 2.8 GM/DL
GLUCOSE SERPL-MCNC: 137 MG/DL (ref 65–99)
HCT VFR BLD AUTO: 41.1 % (ref 34–46.6)
HETEROPH AB SER QL LA: NEGATIVE
HGB BLD-MCNC: 13.6 G/DL (ref 12–15.9)
IMM GRANULOCYTES # BLD AUTO: 0.03 10*3/MM3 (ref 0–0.05)
IMM GRANULOCYTES NFR BLD AUTO: 0.3 % (ref 0–0.5)
LYMPHOCYTES # BLD AUTO: 0.82 10*3/MM3 (ref 0.7–3.1)
LYMPHOCYTES NFR BLD AUTO: 9.5 % (ref 19.6–45.3)
MCH RBC QN AUTO: 30.2 PG (ref 26.6–33)
MCHC RBC AUTO-ENTMCNC: 33.1 G/DL (ref 31.5–35.7)
MCV RBC AUTO: 91.1 FL (ref 79–97)
MONOCYTES # BLD AUTO: 0.91 10*3/MM3 (ref 0.1–0.9)
MONOCYTES NFR BLD AUTO: 10.5 % (ref 5–12)
NEUTROPHILS NFR BLD AUTO: 6.85 10*3/MM3 (ref 1.7–7)
NEUTROPHILS NFR BLD AUTO: 79.3 % (ref 42.7–76)
NRBC BLD AUTO-RTO: 0 /100 WBC (ref 0–0.2)
PLATELET # BLD AUTO: 247 10*3/MM3 (ref 140–450)
PMV BLD AUTO: 9.7 FL (ref 6–12)
POTASSIUM SERPL-SCNC: 3.7 MMOL/L (ref 3.5–5.2)
PROT SERPL-MCNC: 6.7 G/DL (ref 6–8.5)
RBC # BLD AUTO: 4.51 10*6/MM3 (ref 3.77–5.28)
S PYO AG THROAT QL: NEGATIVE
SARS-COV-2 RNA RESP QL NAA+PROBE: NOT DETECTED
SODIUM SERPL-SCNC: 137 MMOL/L (ref 136–145)
TROPONIN T SERPL HS-MCNC: <6 NG/L
WBC NRBC COR # BLD: 8.65 10*3/MM3 (ref 3.4–10.8)

## 2023-10-16 PROCEDURE — 71045 X-RAY EXAM CHEST 1 VIEW: CPT | Performed by: RADIOLOGY

## 2023-10-16 PROCEDURE — 36415 COLL VENOUS BLD VENIPUNCTURE: CPT

## 2023-10-16 PROCEDURE — 25810000003 SODIUM CHLORIDE 0.9 % SOLUTION: Performed by: PHYSICIAN ASSISTANT

## 2023-10-16 PROCEDURE — 87081 CULTURE SCREEN ONLY: CPT | Performed by: PHYSICIAN ASSISTANT

## 2023-10-16 PROCEDURE — 71045 X-RAY EXAM CHEST 1 VIEW: CPT

## 2023-10-16 PROCEDURE — 87880 STREP A ASSAY W/OPTIC: CPT | Performed by: PHYSICIAN ASSISTANT

## 2023-10-16 PROCEDURE — 86140 C-REACTIVE PROTEIN: CPT | Performed by: PHYSICIAN ASSISTANT

## 2023-10-16 PROCEDURE — 87636 SARSCOV2 & INF A&B AMP PRB: CPT | Performed by: PHYSICIAN ASSISTANT

## 2023-10-16 PROCEDURE — 85025 COMPLETE CBC W/AUTO DIFF WBC: CPT | Performed by: PHYSICIAN ASSISTANT

## 2023-10-16 PROCEDURE — 80053 COMPREHEN METABOLIC PANEL: CPT | Performed by: PHYSICIAN ASSISTANT

## 2023-10-16 PROCEDURE — 86308 HETEROPHILE ANTIBODY SCREEN: CPT | Performed by: PHYSICIAN ASSISTANT

## 2023-10-16 PROCEDURE — 84484 ASSAY OF TROPONIN QUANT: CPT | Performed by: PHYSICIAN ASSISTANT

## 2023-10-16 PROCEDURE — 93005 ELECTROCARDIOGRAM TRACING: CPT | Performed by: PHYSICIAN ASSISTANT

## 2023-10-16 PROCEDURE — 99284 EMERGENCY DEPT VISIT MOD MDM: CPT

## 2023-10-16 RX ORDER — SODIUM CHLORIDE 0.9 % (FLUSH) 0.9 %
10 SYRINGE (ML) INJECTION AS NEEDED
Status: DISCONTINUED | OUTPATIENT
Start: 2023-10-16 | End: 2023-10-16 | Stop reason: HOSPADM

## 2023-10-16 RX ORDER — ACETAMINOPHEN 500 MG
1000 TABLET ORAL ONCE
Status: COMPLETED | OUTPATIENT
Start: 2023-10-16 | End: 2023-10-16

## 2023-10-16 RX ADMIN — ACETAMINOPHEN 1000 MG: 500 TABLET ORAL at 12:46

## 2023-10-16 RX ADMIN — SODIUM CHLORIDE 1000 ML: 9 INJECTION, SOLUTION INTRAVENOUS at 14:29

## 2023-10-16 NOTE — ED PROVIDER NOTES
Subjective   History of Present Illness  This is a 33 year old female patient who presents to the ER with chief complaint of cough. No PMH. For the past 4 days, the patient has had worsening cough, runny nose, fever as high as 102, weakness, myalgias. She denies chest pain and SOB. She has been exposed to COVID-19 and rhinovirus recently.       Review of Systems   Constitutional:  Positive for fever. Negative for activity change, appetite change, chills, diaphoresis, fatigue and unexpected weight change.   HENT:  Positive for rhinorrhea. Negative for congestion, dental problem, drooling, ear discharge, ear pain, facial swelling, hearing loss, mouth sores, nosebleeds, postnasal drip, sinus pressure, sinus pain, sneezing, sore throat, tinnitus, trouble swallowing and voice change.    Respiratory:  Positive for cough. Negative for apnea, choking, chest tightness, shortness of breath, wheezing and stridor.    Cardiovascular: Negative.  Negative for chest pain.   Gastrointestinal: Negative.  Negative for abdominal pain.   Endocrine: Negative.    Genitourinary: Negative.  Negative for dysuria.   Musculoskeletal:  Positive for myalgias. Negative for arthralgias, back pain, gait problem, joint swelling, neck pain and neck stiffness.   Skin: Negative.    Neurological: Negative.    Psychiatric/Behavioral: Negative.     All other systems reviewed and are negative.      Past Medical History:   Diagnosis Date    Hepatitis C     Substance abuse        Allergies   Allergen Reactions    Bee Venom Anaphylaxis    Penicillins Anaphylaxis     Throat swelling         No past surgical history on file.    Family History   Problem Relation Age of Onset    Coronary artery disease Father     Diabetes Father     Cervical cancer Mother     SIDS Sister     Pancreatitis Daughter        Social History     Socioeconomic History    Marital status: Single   Tobacco Use    Smoking status: Every Day    Smokeless tobacco: Former   Substance and Sexual  Activity    Alcohol use: No    Drug use: No     Comment: HX of currently on subutex    Sexual activity: Yes     Partners: Male           Objective   Physical Exam  Vitals and nursing note reviewed.   Constitutional:       General: She is not in acute distress.     Appearance: She is well-developed. She is not diaphoretic.   HENT:      Head: Normocephalic and atraumatic.      Right Ear: External ear normal.      Left Ear: External ear normal.      Nose: Nose normal. Rhinorrhea present.      Mouth/Throat:      Pharynx: No oropharyngeal exudate or posterior oropharyngeal erythema.   Eyes:      Conjunctiva/sclera: Conjunctivae normal.      Pupils: Pupils are equal, round, and reactive to light.   Neck:      Vascular: No JVD.      Trachea: No tracheal deviation.   Cardiovascular:      Rate and Rhythm: Normal rate and regular rhythm.      Heart sounds: Normal heart sounds. No murmur heard.  Pulmonary:      Effort: Pulmonary effort is normal. No respiratory distress.      Breath sounds: Normal breath sounds. No wheezing, rhonchi or rales.   Abdominal:      General: Bowel sounds are normal.      Palpations: Abdomen is soft.      Tenderness: There is no abdominal tenderness.   Musculoskeletal:         General: No deformity. Normal range of motion.      Cervical back: Normal range of motion and neck supple.   Skin:     General: Skin is warm and dry.      Coloration: Skin is not pale.      Findings: No erythema or rash.   Neurological:      Mental Status: She is alert and oriented to person, place, and time.      Cranial Nerves: No cranial nerve deficit.   Psychiatric:         Behavior: Behavior normal.         Thought Content: Thought content normal.         Procedures       Results for orders placed or performed during the hospital encounter of 10/16/23   COVID-19 and FLU A/B PCR - Swab, Nasopharynx    Specimen: Nasopharynx; Swab   Result Value Ref Range    COVID19 Not Detected Not Detected - Ref. Range    Influenza A PCR  Detected (A) Not Detected    Influenza B PCR Not Detected Not Detected   Rapid Strep A Screen - Swab, Throat    Specimen: Throat; Swab   Result Value Ref Range    Strep A Ag Negative Negative   Comprehensive Metabolic Panel    Specimen: Blood   Result Value Ref Range    Glucose 137 (H) 65 - 99 mg/dL    BUN 6 6 - 20 mg/dL    Creatinine 0.60 0.57 - 1.00 mg/dL    Sodium 137 136 - 145 mmol/L    Potassium 3.7 3.5 - 5.2 mmol/L    Chloride 106 98 - 107 mmol/L    CO2 20.4 (L) 22.0 - 29.0 mmol/L    Calcium 8.7 8.6 - 10.5 mg/dL    Total Protein 6.7 6.0 - 8.5 g/dL    Albumin 3.9 3.5 - 5.2 g/dL    ALT (SGPT) 43 (H) 1 - 33 U/L    AST (SGOT) 20 1 - 32 U/L    Alkaline Phosphatase 66 39 - 117 U/L    Total Bilirubin 0.2 0.0 - 1.2 mg/dL    Globulin 2.8 gm/dL    A/G Ratio 1.4 g/dL    BUN/Creatinine Ratio 10.0 7.0 - 25.0    Anion Gap 10.6 5.0 - 15.0 mmol/L    eGFR 120.2 >60.0 mL/min/1.73   Mononucleosis Screen    Specimen: Blood   Result Value Ref Range    Monospot Negative Negative   C-reactive Protein    Specimen: Blood   Result Value Ref Range    C-Reactive Protein 2.04 (H) 0.00 - 0.50 mg/dL   Single High Sensitivity Troponin T    Specimen: Blood   Result Value Ref Range    HS Troponin T <6 <10 ng/L   CBC Auto Differential    Specimen: Blood   Result Value Ref Range    WBC 8.65 3.40 - 10.80 10*3/mm3    RBC 4.51 3.77 - 5.28 10*6/mm3    Hemoglobin 13.6 12.0 - 15.9 g/dL    Hematocrit 41.1 34.0 - 46.6 %    MCV 91.1 79.0 - 97.0 fL    MCH 30.2 26.6 - 33.0 pg    MCHC 33.1 31.5 - 35.7 g/dL    RDW 13.3 12.3 - 15.4 %    RDW-SD 44.8 37.0 - 54.0 fl    MPV 9.7 6.0 - 12.0 fL    Platelets 247 140 - 450 10*3/mm3    Neutrophil % 79.3 (H) 42.7 - 76.0 %    Lymphocyte % 9.5 (L) 19.6 - 45.3 %    Monocyte % 10.5 5.0 - 12.0 %    Eosinophil % 0.1 (L) 0.3 - 6.2 %    Basophil % 0.3 0.0 - 1.5 %    Immature Grans % 0.3 0.0 - 0.5 %    Neutrophils, Absolute 6.85 1.70 - 7.00 10*3/mm3    Lymphocytes, Absolute 0.82 0.70 - 3.10 10*3/mm3    Monocytes, Absolute 0.91  (H) 0.10 - 0.90 10*3/mm3    Eosinophils, Absolute 0.01 0.00 - 0.40 10*3/mm3    Basophils, Absolute 0.03 0.00 - 0.20 10*3/mm3    Immature Grans, Absolute 0.03 0.00 - 0.05 10*3/mm3    nRBC 0.0 0.0 - 0.2 /100 WBC   ECG 12 Lead Tachycardia   Result Value Ref Range    QT Interval 304 ms    QTC Interval 438 ms          ED Course  ED Course as of 10/16/23 1605   Mon Oct 16, 2023   1210 EKG notes sinus tachycardia.  125 bpm.  No acute ST elevation.  QTc 438.  Electronically signed by Curt Burr DO, 10/16/23, 12:10 PM EDT.   [SF]   1431 XR Chest 1 View  IMPRESSION:    Unremarkable exam. No acute cardiopulmonary findings identified.        This report was finalized on 10/16/2023 1:59 PM by Dr. Roshan Hill MD   [MM]   1601 Patient diagnosed with Influenza A. Will be d/c home to f/u with PCP in 2 days or return to ER if symptoms worsen.  [MM]      ED Course User Index  [MM] Karen Das PA  [SF] Curt Burr DO                                           Medical Decision Making    This is a 33 year old female patient who presents to the ER with chief complaint of cough. No PMH. For the past 4 days, the patient has had worsening cough, runny nose, fever as high as 102, weakness, myalgias. She denies chest pain and SOB. She has been exposed to COVID-19 and rhinovirus recently.       Problems Addressed:  Influenza A: complicated acute illness or injury    Amount and/or Complexity of Data Reviewed  Labs: ordered. Decision-making details documented in ED Course.  Radiology: ordered. Decision-making details documented in ED Course.  ECG/medicine tests: ordered. Decision-making details documented in ED Course.    Risk  OTC drugs.  Prescription drug management.        Final diagnoses:   Influenza A       ED Disposition  ED Disposition       ED Disposition   Discharge    Condition   Stable    Comment   --               Amanda Stark, APRN  [address]  [facility] [address]  [phone]    In 2 days            Medication List      No changes were made to your prescriptions during this visit.            Karen Das PA  10/16/23 6928       Karen Das PA  10/16/23 2578

## 2023-10-16 NOTE — DISCHARGE INSTRUCTIONS
Please take tylenol and ibuprofen for pain and fever relief. Please drink lots of water. Please follow up with your PCP in 2 days or return to ER if symptoms worsen.

## 2023-10-16 NOTE — ED NOTES
PT attempted to provide urine sample and says she dropped the cup into the toilet and wasn't able to catch any. Provider made aware

## 2023-10-16 NOTE — ED NOTES
"Peripheral IV insertion attempted at right AC, attempt unsuccessful. Patient tolerated procedure. No s/s of acute distress noted. Area is clean dry and intact with no s/s of inflammation noted at this time. Patient reports hx of IV drug use, states \"I always need the ultrasound machine for an IV.\" Provider and charge nurse made aware.   "

## 2023-10-16 NOTE — ED NOTES
"Patient encouraged to attempt to void for UA specimen,  patient states she does not want her urine tested. States \"I have the flu, we know that.\" Patient declines request for urine. Provider made aware.   "

## 2023-10-16 NOTE — Clinical Note
[facility] EMERGENCY DEPARTMENT  [address] [address]  Phone: [phone]    Chely Vicente was seen and treated in our emergency department on 10/16/2023.  She may return to work on 10/23/2023.         Thank you for choosing [facility].    Karen Das PA

## 2023-10-16 NOTE — ED NOTES
Attempted to stick pt and was unsuccessful. Another nurse is attempting at this time. Pt stated that she would rather wait until provider had a chance to come use ultrasound guided IV. Provider made aware,

## 2023-10-18 LAB
BACTERIA SPEC AEROBE CULT: NORMAL
QT INTERVAL: 304 MS
QTC INTERVAL: 438 MS

## 2024-09-11 ENCOUNTER — LAB (OUTPATIENT)
Dept: LAB | Facility: HOSPITAL | Age: 34
End: 2024-09-11
Payer: COMMERCIAL

## 2024-09-11 ENCOUNTER — TRANSCRIBE ORDERS (OUTPATIENT)
Dept: ADMINISTRATIVE | Facility: HOSPITAL | Age: 34
End: 2024-09-11
Payer: COMMERCIAL

## 2024-09-11 DIAGNOSIS — K21.9 CHALASIA OF LOWER ESOPHAGEAL SPHINCTER: ICD-10-CM

## 2024-09-11 DIAGNOSIS — R53.82 CHRONIC FATIGUE: Primary | ICD-10-CM

## 2024-09-11 DIAGNOSIS — M25.50 PAIN IN JOINT, MULTIPLE SITES: ICD-10-CM

## 2024-09-11 DIAGNOSIS — E56.9 MULTIPLE VITAMIN DEFICIENCY DISEASE: ICD-10-CM

## 2024-09-11 DIAGNOSIS — R53.82 CHRONIC FATIGUE: ICD-10-CM

## 2024-09-11 DIAGNOSIS — R60.9 EDEMA, UNSPECIFIED TYPE: ICD-10-CM

## 2024-09-11 DIAGNOSIS — E66.9 OBESITY, UNSPECIFIED CLASSIFICATION, UNSPECIFIED OBESITY TYPE, UNSPECIFIED WHETHER SERIOUS COMORBIDITY PRESENT: ICD-10-CM

## 2024-09-11 LAB
25(OH)D3 SERPL-MCNC: 26 NG/ML (ref 30–100)
ALBUMIN SERPL-MCNC: 3.9 G/DL (ref 3.5–5.2)
ALBUMIN/GLOB SERPL: 1.4 G/DL
ALP SERPL-CCNC: 72 U/L (ref 39–117)
ALT SERPL W P-5'-P-CCNC: 69 U/L (ref 1–33)
ANION GAP SERPL CALCULATED.3IONS-SCNC: 15 MMOL/L (ref 5–15)
AST SERPL-CCNC: 40 U/L (ref 1–32)
BASOPHILS # BLD AUTO: 0.05 10*3/MM3 (ref 0–0.2)
BASOPHILS NFR BLD AUTO: 0.5 % (ref 0–1.5)
BILIRUB SERPL-MCNC: 0.3 MG/DL (ref 0–1.2)
BUN SERPL-MCNC: 8 MG/DL (ref 6–20)
BUN/CREAT SERPL: 12.9 (ref 7–25)
CALCIUM SPEC-SCNC: 9.1 MG/DL (ref 8.6–10.5)
CHLORIDE SERPL-SCNC: 101 MMOL/L (ref 98–107)
CHOLEST SERPL-MCNC: 193 MG/DL (ref 0–200)
CO2 SERPL-SCNC: 21 MMOL/L (ref 22–29)
CREAT SERPL-MCNC: 0.62 MG/DL (ref 0.57–1)
DEPRECATED RDW RBC AUTO: 44.1 FL (ref 37–54)
EGFRCR SERPLBLD CKD-EPI 2021: 118.5 ML/MIN/1.73
EOSINOPHIL # BLD AUTO: 0.13 10*3/MM3 (ref 0–0.4)
EOSINOPHIL NFR BLD AUTO: 1.3 % (ref 0.3–6.2)
ERYTHROCYTE [DISTWIDTH] IN BLOOD BY AUTOMATED COUNT: 12.9 % (ref 12.3–15.4)
GLOBULIN UR ELPH-MCNC: 2.7 GM/DL
GLUCOSE SERPL-MCNC: 112 MG/DL (ref 65–99)
HBA1C MFR BLD: 5.5 % (ref 4.8–5.6)
HCT VFR BLD AUTO: 45.4 % (ref 34–46.6)
HDLC SERPL-MCNC: 41 MG/DL (ref 40–60)
HGB BLD-MCNC: 14.4 G/DL (ref 12–15.9)
IMM GRANULOCYTES # BLD AUTO: 0.02 10*3/MM3 (ref 0–0.05)
IMM GRANULOCYTES NFR BLD AUTO: 0.2 % (ref 0–0.5)
IRON 24H UR-MRATE: 110 MCG/DL (ref 37–145)
IRON SATN MFR SERPL: 29 % (ref 20–50)
LDLC SERPL CALC-MCNC: 119 MG/DL (ref 0–100)
LDLC/HDLC SERPL: 2.81 {RATIO}
LYMPHOCYTES # BLD AUTO: 2.71 10*3/MM3 (ref 0.7–3.1)
LYMPHOCYTES NFR BLD AUTO: 28.1 % (ref 19.6–45.3)
MCH RBC QN AUTO: 29.6 PG (ref 26.6–33)
MCHC RBC AUTO-ENTMCNC: 31.7 G/DL (ref 31.5–35.7)
MCV RBC AUTO: 93.2 FL (ref 79–97)
MONOCYTES # BLD AUTO: 0.57 10*3/MM3 (ref 0.1–0.9)
MONOCYTES NFR BLD AUTO: 5.9 % (ref 5–12)
NEUTROPHILS NFR BLD AUTO: 6.18 10*3/MM3 (ref 1.7–7)
NEUTROPHILS NFR BLD AUTO: 64 % (ref 42.7–76)
NRBC BLD AUTO-RTO: 0 /100 WBC (ref 0–0.2)
PLATELET # BLD AUTO: 305 10*3/MM3 (ref 140–450)
PMV BLD AUTO: 9.2 FL (ref 6–12)
POTASSIUM SERPL-SCNC: 4.2 MMOL/L (ref 3.5–5.2)
PROT SERPL-MCNC: 6.6 G/DL (ref 6–8.5)
RBC # BLD AUTO: 4.87 10*6/MM3 (ref 3.77–5.28)
SODIUM SERPL-SCNC: 137 MMOL/L (ref 136–145)
T3 SERPL-MCNC: 164 NG/DL (ref 80–200)
T4 FREE SERPL-MCNC: 1.25 NG/DL (ref 0.92–1.68)
TIBC SERPL-MCNC: 381 MCG/DL (ref 298–536)
TRANSFERRIN SERPL-MCNC: 256 MG/DL (ref 200–360)
TRIGL SERPL-MCNC: 184 MG/DL (ref 0–150)
TSH SERPL DL<=0.05 MIU/L-ACNC: 1.39 UIU/ML (ref 0.27–4.2)
URATE SERPL-MCNC: 6.3 MG/DL (ref 2.4–5.7)
VIT B12 BLD-MCNC: 457 PG/ML (ref 211–946)
VLDLC SERPL-MCNC: 33 MG/DL (ref 5–40)
WBC NRBC COR # BLD AUTO: 9.66 10*3/MM3 (ref 3.4–10.8)

## 2024-09-11 PROCEDURE — 84480 ASSAY TRIIODOTHYRONINE (T3): CPT

## 2024-09-11 PROCEDURE — 83540 ASSAY OF IRON: CPT

## 2024-09-11 PROCEDURE — 84439 ASSAY OF FREE THYROXINE: CPT

## 2024-09-11 PROCEDURE — 82607 VITAMIN B-12: CPT

## 2024-09-11 PROCEDURE — 82306 VITAMIN D 25 HYDROXY: CPT

## 2024-09-11 PROCEDURE — 36415 COLL VENOUS BLD VENIPUNCTURE: CPT

## 2024-09-11 PROCEDURE — 80050 GENERAL HEALTH PANEL: CPT

## 2024-09-11 PROCEDURE — 84550 ASSAY OF BLOOD/URIC ACID: CPT

## 2024-09-11 PROCEDURE — 86376 MICROSOMAL ANTIBODY EACH: CPT

## 2024-09-11 PROCEDURE — 84466 ASSAY OF TRANSFERRIN: CPT

## 2024-09-11 PROCEDURE — 83036 HEMOGLOBIN GLYCOSYLATED A1C: CPT

## 2024-09-11 PROCEDURE — 80061 LIPID PANEL: CPT

## 2024-09-12 LAB — THYROPEROXIDASE AB SERPL-ACNC: 15 IU/ML (ref 0–34)

## 2024-09-17 ENCOUNTER — TRANSCRIBE ORDERS (OUTPATIENT)
Dept: ADMINISTRATIVE | Facility: HOSPITAL | Age: 34
End: 2024-09-17
Payer: COMMERCIAL

## 2024-09-17 DIAGNOSIS — R10.9 ABDOMINAL PAIN, UNSPECIFIED ABDOMINAL LOCATION: Primary | ICD-10-CM

## 2024-10-04 ENCOUNTER — HOSPITAL ENCOUNTER (OUTPATIENT)
Dept: ULTRASOUND IMAGING | Facility: HOSPITAL | Age: 34
Discharge: HOME | End: 2024-10-04
Payer: COMMERCIAL

## 2024-10-04 DIAGNOSIS — R10.9 ABDOMINAL PAIN, UNSPECIFIED ABDOMINAL LOCATION: ICD-10-CM

## 2024-10-04 PROCEDURE — 76700 US EXAM ABDOM COMPLETE: CPT

## 2024-10-16 ENCOUNTER — OFFICE VISIT (OUTPATIENT)
Dept: SURGERY | Facility: CLINIC | Age: 34
End: 2024-10-16
Payer: COMMERCIAL

## 2024-10-16 VITALS
HEIGHT: 60 IN | SYSTOLIC BLOOD PRESSURE: 104 MMHG | DIASTOLIC BLOOD PRESSURE: 68 MMHG | WEIGHT: 278 LBS | BODY MASS INDEX: 54.58 KG/M2

## 2024-10-16 DIAGNOSIS — K80.20 CALCULUS OF GALLBLADDER WITHOUT CHOLECYSTITIS WITHOUT OBSTRUCTION: Primary | ICD-10-CM

## 2024-10-16 DIAGNOSIS — R10.11 RIGHT UPPER QUADRANT ABDOMINAL PAIN: ICD-10-CM

## 2024-10-16 RX ORDER — LISINOPRIL 10 MG/1
TABLET ORAL
COMMUNITY
Start: 2024-10-07

## 2024-10-16 RX ORDER — ALLOPURINOL 100 MG/1
TABLET ORAL
COMMUNITY
Start: 2024-10-15

## 2024-10-16 RX ORDER — KETOROLAC TROMETHAMINE 10 MG/1
TABLET, FILM COATED ORAL
COMMUNITY
Start: 2024-10-08

## 2024-10-16 RX ORDER — SODIUM CHLORIDE 0.9 % (FLUSH) 0.9 %
10 SYRINGE (ML) INJECTION AS NEEDED
OUTPATIENT
Start: 2024-10-16

## 2024-10-16 RX ORDER — INDOCYANINE GREEN AND WATER 25 MG
7.5 KIT INJECTION ONCE
OUTPATIENT
Start: 2024-10-16 | End: 2024-10-16

## 2024-10-16 RX ORDER — SODIUM CHLORIDE 0.9 % (FLUSH) 0.9 %
3 SYRINGE (ML) INJECTION EVERY 12 HOURS SCHEDULED
OUTPATIENT
Start: 2024-10-16

## 2024-10-16 RX ORDER — ERGOCALCIFEROL 1.25 MG/1
CAPSULE, LIQUID FILLED ORAL
COMMUNITY
Start: 2024-10-15

## 2024-10-16 NOTE — H&P (VIEW-ONLY)
Subjective   Chely Viecnte is a 34 y.o. female who presents today for Initial Evaluation    Chief Complaint:    Chief Complaint   Patient presents with   • Abdominal Pain        History of Present Illness:    History of Present Illness Chely is a 34-year-old female who presents for evaluation for she had labs done at her primary care provider's office.  Had a slight elevation in her LFTs and was having some right upper quadrant abdominal pain.  She then underwent ultrasound of her abdomen that revealed gallstones.  She denies any nausea, vomiting or diarrhea.  Denies any worsening factors she does not have a history of any abdominal surgeries.  Denies any surgeries in the past.    The following portions of the patient's history were reviewed and updated as appropriate: allergies, current medications, past family history, past medical history, past social history, past surgical history and problem list.    Past Medical History:  Past Medical History:   Diagnosis Date   • Hepatitis C    • Substance abuse        Social History:  Social History     Socioeconomic History   • Marital status:    Tobacco Use   • Smoking status: Former     Types: Cigarettes     Passive exposure: Past   • Smokeless tobacco: Former   Vaping Use   • Vaping status: Every Day   • Substances: Nicotine   • Devices: Pre-filled or refillable cartridge   Substance and Sexual Activity   • Alcohol use: No   • Drug use: No     Comment: HX of currently on subutex   • Sexual activity: Yes     Partners: Male       Family History:  Family History   Problem Relation Age of Onset   • Coronary artery disease Father    • Diabetes Father    • Cervical cancer Mother    • SIDS Sister    • Pancreatitis Daughter        Past Surgical History:  History reviewed. No pertinent surgical history.    Problem List:  Patient Active Problem List   Diagnosis   • Postpartum care following vaginal delivery       Allergy:   Allergies   Allergen Reactions   • Bee Venom  "Anaphylaxis   • Penicillins Anaphylaxis     Throat swelling          Current Medications:   Current Outpatient Medications   Medication Sig Dispense Refill   • allopurinol (ZYLOPRIM) 100 MG tablet      • ketorolac (TORADOL) 10 MG tablet      • lisinopril (PRINIVIL,ZESTRIL) 10 MG tablet      • vitamin D (ERGOCALCIFEROL) 1.25 MG (39552 UT) capsule capsule        No current facility-administered medications for this visit.       Review of Systems:    Review of Systems   Gastrointestinal:  Positive for abdominal pain. Negative for nausea and vomiting.         Physical Exam:   Physical Exam  Constitutional:       Appearance: Normal appearance.   HENT:      Head: Normocephalic and atraumatic.      Right Ear: External ear normal.      Left Ear: External ear normal.   Eyes:      Conjunctiva/sclera: Conjunctivae normal.   Pulmonary:      Effort: Pulmonary effort is normal.   Abdominal:      General: Abdomen is flat.   Musculoskeletal:         General: Normal range of motion.      Cervical back: Normal range of motion and neck supple.   Skin:     General: Skin is warm and dry.      Capillary Refill: Capillary refill takes less than 2 seconds.   Neurological:      General: No focal deficit present.      Mental Status: She is alert and oriented to person, place, and time.   Psychiatric:         Mood and Affect: Mood normal.         Behavior: Behavior normal.       Vitals:  Blood pressure 104/68, height 152.4 cm (60\"), weight 126 kg (278 lb), not currently breastfeeding.   Body mass index is 54.29 kg/m².     Lab Results:   Lab on 09/11/2024   Component Date Value Ref Range Status   • Iron 09/11/2024 110  37 - 145 mcg/dL Final   • Iron Saturation (TSAT) 09/11/2024 29  20 - 50 % Final   • Transferrin 09/11/2024 256  200 - 360 mg/dL Final   • TIBC 09/11/2024 381  298 - 536 mcg/dL Final   • Total Cholesterol 09/11/2024 193  0 - 200 mg/dL Final   • Triglycerides 09/11/2024 184 (H)  0 - 150 mg/dL Final   • HDL Cholesterol 09/11/2024 " 41  40 - 60 mg/dL Final   • LDL Cholesterol  09/11/2024 119 (H)  0 - 100 mg/dL Final   • VLDL Cholesterol 09/11/2024 33  5 - 40 mg/dL Final   • LDL/HDL Ratio 09/11/2024 2.81   Final   • Hemoglobin A1C 09/11/2024 5.50  4.80 - 5.60 % Final   • Glucose 09/11/2024 112 (H)  65 - 99 mg/dL Final   • BUN 09/11/2024 8  6 - 20 mg/dL Final   • Creatinine 09/11/2024 0.62  0.57 - 1.00 mg/dL Final   • Sodium 09/11/2024 137  136 - 145 mmol/L Final   • Potassium 09/11/2024 4.2  3.5 - 5.2 mmol/L Final    Slight hemolysis detected by analyzer. Result may be falsely elevated.   • Chloride 09/11/2024 101  98 - 107 mmol/L Final   • CO2 09/11/2024 21.0 (L)  22.0 - 29.0 mmol/L Final   • Calcium 09/11/2024 9.1  8.6 - 10.5 mg/dL Final   • Total Protein 09/11/2024 6.6  6.0 - 8.5 g/dL Final   • Albumin 09/11/2024 3.9  3.5 - 5.2 g/dL Final   • ALT (SGPT) 09/11/2024 69 (H)  1 - 33 U/L Final   • AST (SGOT) 09/11/2024 40 (H)  1 - 32 U/L Final    Slight hemolysis detected by analyzer. Result may be falsely elevated.   • Alkaline Phosphatase 09/11/2024 72  39 - 117 U/L Final   • Total Bilirubin 09/11/2024 0.3  0.0 - 1.2 mg/dL Final   • Globulin 09/11/2024 2.7  gm/dL Final   • A/G Ratio 09/11/2024 1.4  g/dL Final   • BUN/Creatinine Ratio 09/11/2024 12.9  7.0 - 25.0 Final   • Anion Gap 09/11/2024 15.0  5.0 - 15.0 mmol/L Final   • eGFR 09/11/2024 118.5  >60.0 mL/min/1.73 Final   • 25 Hydroxy, Vitamin D 09/11/2024 26.0 (L)  30.0 - 100.0 ng/ml Final   • Vitamin B-12 09/11/2024 457  211 - 946 pg/mL Final   • TSH 09/11/2024 1.390  0.270 - 4.200 uIU/mL Final   • T3, Total 09/11/2024 164.0  80.0 - 200.0 ng/dl Final   • Free T4 09/11/2024 1.25  0.92 - 1.68 ng/dL Final   • Thyroid Peroxidase Antibody 09/11/2024 15  0 - 34 IU/mL Final   • Uric Acid 09/11/2024 6.3 (H)  2.4 - 5.7 mg/dL Final   • WBC 09/11/2024 9.66  3.40 - 10.80 10*3/mm3 Final   • RBC 09/11/2024 4.87  3.77 - 5.28 10*6/mm3 Final   • Hemoglobin 09/11/2024 14.4  12.0 - 15.9 g/dL Final   •  Hematocrit 09/11/2024 45.4  34.0 - 46.6 % Final   • MCV 09/11/2024 93.2  79.0 - 97.0 fL Final   • MCH 09/11/2024 29.6  26.6 - 33.0 pg Final   • MCHC 09/11/2024 31.7  31.5 - 35.7 g/dL Final   • RDW 09/11/2024 12.9  12.3 - 15.4 % Final   • RDW-SD 09/11/2024 44.1  37.0 - 54.0 fl Final   • MPV 09/11/2024 9.2  6.0 - 12.0 fL Final   • Platelets 09/11/2024 305  140 - 450 10*3/mm3 Final   • Neutrophil % 09/11/2024 64.0  42.7 - 76.0 % Final   • Lymphocyte % 09/11/2024 28.1  19.6 - 45.3 % Final   • Monocyte % 09/11/2024 5.9  5.0 - 12.0 % Final   • Eosinophil % 09/11/2024 1.3  0.3 - 6.2 % Final   • Basophil % 09/11/2024 0.5  0.0 - 1.5 % Final   • Immature Grans % 09/11/2024 0.2  0.0 - 0.5 % Final   • Neutrophils, Absolute 09/11/2024 6.18  1.70 - 7.00 10*3/mm3 Final   • Lymphocytes, Absolute 09/11/2024 2.71  0.70 - 3.10 10*3/mm3 Final   • Monocytes, Absolute 09/11/2024 0.57  0.10 - 0.90 10*3/mm3 Final   • Eosinophils, Absolute 09/11/2024 0.13  0.00 - 0.40 10*3/mm3 Final   • Basophils, Absolute 09/11/2024 0.05  0.00 - 0.20 10*3/mm3 Final   • Immature Grans, Absolute 09/11/2024 0.02  0.00 - 0.05 10*3/mm3 Final   • nRBC 09/11/2024 0.0  0.0 - 0.2 /100 WBC Final       Imaging:   US Abdomen Complete  Narrative: EXAM:    US Abdomen Complete     EXAM DATE:    10/4/2024 8:27 AM     CLINICAL HISTORY:    R10.9; R10.9-Unspecified abdominal pain     TECHNIQUE:    Real-time ultrasound of the abdomen with image documentation.     COMPARISON:    No relevant prior studies available.     FINDINGS:    Liver:  Unremarkable as visualized.  No mass.  No intrahepatic bile  duct dilation.    Gallbladder:  Gallstones.  Gallbladder otherwise unremarkable.    Common bile duct:  Unremarkable as visualized.  No stones.  No  dilation.  Common bile duct measures 0.6 cm in diameter.    Pancreas:  Unremarkable as visualized.    Kidneys:  Unremarkable as visualized.  No stones.  No hydronephrosis.   The right kidney measures 10 cm in length.  The left kidney  measures 11  cm in length.    Spleen:  Unremarkable as visualized.  The spleen measures 11 cm in  maximum dimension.    Aorta:  Unremarkable as visualized.  No aneurysm.    Inferior vena cava:  Unremarkable as visualized.     Impression:   Gallstones.  Gallbladder otherwise unremarkable. Mild prominence of  the common bile duct. Consider MRCP.        This report was finalized on 10/4/2024 8:44 AM by Dr. Roshan Hill MD.           Assessment & Plan   Diagnoses and all orders for this visit:    1. Calculus of gallbladder without cholecystitis without obstruction (Primary)  -     Case Request; Standing  -     sodium chloride 0.9 % flush 3 mL  -     sodium chloride 0.9 % flush 10 mL  -     indocyanine green (IC-GREEN) injection 7.5 mg  -     ceFAZolin 3000 mg IVPB in 100 mL NS (VTB)  -     Case Request    2. Right upper quadrant abdominal pain  -     Case Request; Standing  -     sodium chloride 0.9 % flush 3 mL  -     sodium chloride 0.9 % flush 10 mL  -     indocyanine green (IC-GREEN) injection 7.5 mg  -     ceFAZolin 3000 mg IVPB in 100 mL NS (VTB)  -     Case Request    Other orders  -     Follow Anesthesia Guidelines / Protocol; Future  -     Follow Anesthesia Guidelines / Protocol; Standing  -     Verify / Perform Chlorhexidine Skin Prep; Standing  -     Provide NPO Instructions to Patient; Future  -     Chlorhexidine Skin Prep; Future  -     Insert Peripheral IV; Standing  -     Saline Lock & Maintain IV Access; Standing  -     Place Sequential Compression Device; Standing  -     Maintain Sequential Compression Device; Standing    Mariel is a 34-year-old female presents for evaluation for gallstones and right upper quadrant abdominal pain.  She did have an mild elevation in her LFTs, total bilirubin was normal.  I did discuss his case with Dr. Lerma who suggested proceeding with cholecystectomy.  Patient verbalized understanding and agrees.  To undergo robotic cholecystectomy with Dr. Lerma.    Visit  Diagnoses:    ICD-10-CM ICD-9-CM   1. Calculus of gallbladder without cholecystitis without obstruction  K80.20 574.20   2. Right upper quadrant abdominal pain  R10.11 789.01         MEDS ORDERED DURING VISIT:  No orders of the defined types were placed in this encounter.      Return for Follow-up postop.             This document has been electronically signed by DONELL Mcdonald  October 16, 2024 12:54 EDT    Please note that portions of this note were completed with a voice recognition program.

## 2024-10-16 NOTE — PROGRESS NOTES
Subjective   Chely Vicente is a 34 y.o. female who presents today for Initial Evaluation    Chief Complaint:    Chief Complaint   Patient presents with   • Abdominal Pain        History of Present Illness:    History of Present Illness Chely is a 34-year-old female who presents for evaluation for she had labs done at her primary care provider's office.  Had a slight elevation in her LFTs and was having some right upper quadrant abdominal pain.  She then underwent ultrasound of her abdomen that revealed gallstones.  She denies any nausea, vomiting or diarrhea.  Denies any worsening factors she does not have a history of any abdominal surgeries.  Denies any surgeries in the past.    The following portions of the patient's history were reviewed and updated as appropriate: allergies, current medications, past family history, past medical history, past social history, past surgical history and problem list.    Past Medical History:  Past Medical History:   Diagnosis Date   • Hepatitis C    • Substance abuse        Social History:  Social History     Socioeconomic History   • Marital status:    Tobacco Use   • Smoking status: Former     Types: Cigarettes     Passive exposure: Past   • Smokeless tobacco: Former   Vaping Use   • Vaping status: Every Day   • Substances: Nicotine   • Devices: Pre-filled or refillable cartridge   Substance and Sexual Activity   • Alcohol use: No   • Drug use: No     Comment: HX of currently on subutex   • Sexual activity: Yes     Partners: Male       Family History:  Family History   Problem Relation Age of Onset   • Coronary artery disease Father    • Diabetes Father    • Cervical cancer Mother    • SIDS Sister    • Pancreatitis Daughter        Past Surgical History:  History reviewed. No pertinent surgical history.    Problem List:  Patient Active Problem List   Diagnosis   • Postpartum care following vaginal delivery       Allergy:   Allergies   Allergen Reactions   • Bee Venom  "Anaphylaxis   • Penicillins Anaphylaxis     Throat swelling          Current Medications:   Current Outpatient Medications   Medication Sig Dispense Refill   • allopurinol (ZYLOPRIM) 100 MG tablet      • ketorolac (TORADOL) 10 MG tablet      • lisinopril (PRINIVIL,ZESTRIL) 10 MG tablet      • vitamin D (ERGOCALCIFEROL) 1.25 MG (44599 UT) capsule capsule        No current facility-administered medications for this visit.       Review of Systems:    Review of Systems   Gastrointestinal:  Positive for abdominal pain. Negative for nausea and vomiting.         Physical Exam:   Physical Exam  Constitutional:       Appearance: Normal appearance.   HENT:      Head: Normocephalic and atraumatic.      Right Ear: External ear normal.      Left Ear: External ear normal.   Eyes:      Conjunctiva/sclera: Conjunctivae normal.   Pulmonary:      Effort: Pulmonary effort is normal.   Abdominal:      General: Abdomen is flat.   Musculoskeletal:         General: Normal range of motion.      Cervical back: Normal range of motion and neck supple.   Skin:     General: Skin is warm and dry.      Capillary Refill: Capillary refill takes less than 2 seconds.   Neurological:      General: No focal deficit present.      Mental Status: She is alert and oriented to person, place, and time.   Psychiatric:         Mood and Affect: Mood normal.         Behavior: Behavior normal.       Vitals:  Blood pressure 104/68, height 152.4 cm (60\"), weight 126 kg (278 lb), not currently breastfeeding.   Body mass index is 54.29 kg/m².     Lab Results:   Lab on 09/11/2024   Component Date Value Ref Range Status   • Iron 09/11/2024 110  37 - 145 mcg/dL Final   • Iron Saturation (TSAT) 09/11/2024 29  20 - 50 % Final   • Transferrin 09/11/2024 256  200 - 360 mg/dL Final   • TIBC 09/11/2024 381  298 - 536 mcg/dL Final   • Total Cholesterol 09/11/2024 193  0 - 200 mg/dL Final   • Triglycerides 09/11/2024 184 (H)  0 - 150 mg/dL Final   • HDL Cholesterol 09/11/2024 " 41  40 - 60 mg/dL Final   • LDL Cholesterol  09/11/2024 119 (H)  0 - 100 mg/dL Final   • VLDL Cholesterol 09/11/2024 33  5 - 40 mg/dL Final   • LDL/HDL Ratio 09/11/2024 2.81   Final   • Hemoglobin A1C 09/11/2024 5.50  4.80 - 5.60 % Final   • Glucose 09/11/2024 112 (H)  65 - 99 mg/dL Final   • BUN 09/11/2024 8  6 - 20 mg/dL Final   • Creatinine 09/11/2024 0.62  0.57 - 1.00 mg/dL Final   • Sodium 09/11/2024 137  136 - 145 mmol/L Final   • Potassium 09/11/2024 4.2  3.5 - 5.2 mmol/L Final    Slight hemolysis detected by analyzer. Result may be falsely elevated.   • Chloride 09/11/2024 101  98 - 107 mmol/L Final   • CO2 09/11/2024 21.0 (L)  22.0 - 29.0 mmol/L Final   • Calcium 09/11/2024 9.1  8.6 - 10.5 mg/dL Final   • Total Protein 09/11/2024 6.6  6.0 - 8.5 g/dL Final   • Albumin 09/11/2024 3.9  3.5 - 5.2 g/dL Final   • ALT (SGPT) 09/11/2024 69 (H)  1 - 33 U/L Final   • AST (SGOT) 09/11/2024 40 (H)  1 - 32 U/L Final    Slight hemolysis detected by analyzer. Result may be falsely elevated.   • Alkaline Phosphatase 09/11/2024 72  39 - 117 U/L Final   • Total Bilirubin 09/11/2024 0.3  0.0 - 1.2 mg/dL Final   • Globulin 09/11/2024 2.7  gm/dL Final   • A/G Ratio 09/11/2024 1.4  g/dL Final   • BUN/Creatinine Ratio 09/11/2024 12.9  7.0 - 25.0 Final   • Anion Gap 09/11/2024 15.0  5.0 - 15.0 mmol/L Final   • eGFR 09/11/2024 118.5  >60.0 mL/min/1.73 Final   • 25 Hydroxy, Vitamin D 09/11/2024 26.0 (L)  30.0 - 100.0 ng/ml Final   • Vitamin B-12 09/11/2024 457  211 - 946 pg/mL Final   • TSH 09/11/2024 1.390  0.270 - 4.200 uIU/mL Final   • T3, Total 09/11/2024 164.0  80.0 - 200.0 ng/dl Final   • Free T4 09/11/2024 1.25  0.92 - 1.68 ng/dL Final   • Thyroid Peroxidase Antibody 09/11/2024 15  0 - 34 IU/mL Final   • Uric Acid 09/11/2024 6.3 (H)  2.4 - 5.7 mg/dL Final   • WBC 09/11/2024 9.66  3.40 - 10.80 10*3/mm3 Final   • RBC 09/11/2024 4.87  3.77 - 5.28 10*6/mm3 Final   • Hemoglobin 09/11/2024 14.4  12.0 - 15.9 g/dL Final   •  Hematocrit 09/11/2024 45.4  34.0 - 46.6 % Final   • MCV 09/11/2024 93.2  79.0 - 97.0 fL Final   • MCH 09/11/2024 29.6  26.6 - 33.0 pg Final   • MCHC 09/11/2024 31.7  31.5 - 35.7 g/dL Final   • RDW 09/11/2024 12.9  12.3 - 15.4 % Final   • RDW-SD 09/11/2024 44.1  37.0 - 54.0 fl Final   • MPV 09/11/2024 9.2  6.0 - 12.0 fL Final   • Platelets 09/11/2024 305  140 - 450 10*3/mm3 Final   • Neutrophil % 09/11/2024 64.0  42.7 - 76.0 % Final   • Lymphocyte % 09/11/2024 28.1  19.6 - 45.3 % Final   • Monocyte % 09/11/2024 5.9  5.0 - 12.0 % Final   • Eosinophil % 09/11/2024 1.3  0.3 - 6.2 % Final   • Basophil % 09/11/2024 0.5  0.0 - 1.5 % Final   • Immature Grans % 09/11/2024 0.2  0.0 - 0.5 % Final   • Neutrophils, Absolute 09/11/2024 6.18  1.70 - 7.00 10*3/mm3 Final   • Lymphocytes, Absolute 09/11/2024 2.71  0.70 - 3.10 10*3/mm3 Final   • Monocytes, Absolute 09/11/2024 0.57  0.10 - 0.90 10*3/mm3 Final   • Eosinophils, Absolute 09/11/2024 0.13  0.00 - 0.40 10*3/mm3 Final   • Basophils, Absolute 09/11/2024 0.05  0.00 - 0.20 10*3/mm3 Final   • Immature Grans, Absolute 09/11/2024 0.02  0.00 - 0.05 10*3/mm3 Final   • nRBC 09/11/2024 0.0  0.0 - 0.2 /100 WBC Final       Imaging:   US Abdomen Complete  Narrative: EXAM:    US Abdomen Complete     EXAM DATE:    10/4/2024 8:27 AM     CLINICAL HISTORY:    R10.9; R10.9-Unspecified abdominal pain     TECHNIQUE:    Real-time ultrasound of the abdomen with image documentation.     COMPARISON:    No relevant prior studies available.     FINDINGS:    Liver:  Unremarkable as visualized.  No mass.  No intrahepatic bile  duct dilation.    Gallbladder:  Gallstones.  Gallbladder otherwise unremarkable.    Common bile duct:  Unremarkable as visualized.  No stones.  No  dilation.  Common bile duct measures 0.6 cm in diameter.    Pancreas:  Unremarkable as visualized.    Kidneys:  Unremarkable as visualized.  No stones.  No hydronephrosis.   The right kidney measures 10 cm in length.  The left kidney  measures 11  cm in length.    Spleen:  Unremarkable as visualized.  The spleen measures 11 cm in  maximum dimension.    Aorta:  Unremarkable as visualized.  No aneurysm.    Inferior vena cava:  Unremarkable as visualized.     Impression:   Gallstones.  Gallbladder otherwise unremarkable. Mild prominence of  the common bile duct. Consider MRCP.        This report was finalized on 10/4/2024 8:44 AM by Dr. Roshan Hill MD.           Assessment & Plan   Diagnoses and all orders for this visit:    1. Calculus of gallbladder without cholecystitis without obstruction (Primary)  -     Case Request; Standing  -     sodium chloride 0.9 % flush 3 mL  -     sodium chloride 0.9 % flush 10 mL  -     indocyanine green (IC-GREEN) injection 7.5 mg  -     ceFAZolin 3000 mg IVPB in 100 mL NS (VTB)  -     Case Request    2. Right upper quadrant abdominal pain  -     Case Request; Standing  -     sodium chloride 0.9 % flush 3 mL  -     sodium chloride 0.9 % flush 10 mL  -     indocyanine green (IC-GREEN) injection 7.5 mg  -     ceFAZolin 3000 mg IVPB in 100 mL NS (VTB)  -     Case Request    Other orders  -     Follow Anesthesia Guidelines / Protocol; Future  -     Follow Anesthesia Guidelines / Protocol; Standing  -     Verify / Perform Chlorhexidine Skin Prep; Standing  -     Provide NPO Instructions to Patient; Future  -     Chlorhexidine Skin Prep; Future  -     Insert Peripheral IV; Standing  -     Saline Lock & Maintain IV Access; Standing  -     Place Sequential Compression Device; Standing  -     Maintain Sequential Compression Device; Standing    Mariel is a 34-year-old female presents for evaluation for gallstones and right upper quadrant abdominal pain.  She did have an mild elevation in her LFTs, total bilirubin was normal.  I did discuss his case with Dr. Lerma who suggested proceeding with cholecystectomy.  Patient verbalized understanding and agrees.  To undergo robotic cholecystectomy with Dr. Lerma.    Visit  Diagnoses:    ICD-10-CM ICD-9-CM   1. Calculus of gallbladder without cholecystitis without obstruction  K80.20 574.20   2. Right upper quadrant abdominal pain  R10.11 789.01         MEDS ORDERED DURING VISIT:  No orders of the defined types were placed in this encounter.      Return for Follow-up postop.             This document has been electronically signed by DONELL Mcdonald  October 16, 2024 12:54 EDT    Please note that portions of this note were completed with a voice recognition program.

## 2024-10-21 PROBLEM — K80.20 CALCULUS OF GALLBLADDER WITHOUT CHOLECYSTITIS WITHOUT OBSTRUCTION: Status: ACTIVE | Noted: 2024-10-16

## 2024-10-21 PROBLEM — R10.11 RIGHT UPPER QUADRANT ABDOMINAL PAIN: Status: ACTIVE | Noted: 2024-10-16

## 2024-11-05 NOTE — DISCHARGE INSTRUCTIONS
TAKE the following medications the morning of surgery:      Please discontinue all blood thinners and anticoagulants (except aspirin) prior to surgery as per your surgeon and cardiologist instructions.  Aspirin may be continued up to the day prior to surgery.    HOLD all diabetic medications the morning of surgery as order by physician.    Please follow instructions on use of prep cloths provided by nurse. Return instruction sheet to pre-op nurse on day of surgery.    Arrival time for surgery on   will be given to you by Dr. King.    A RESPONSIBLE PERSON MUST REMAIN IN THE WAITING ROOM DURING YOUR PROCEDURE AND A RESPONSIBLE  MUST BE AVAILABLE UPON YOUR DISCHARGE.    General Instructions:  Do NOT eat or drink after midnight which includes water, mints, or gum.  You may brush your teeth. Dental appliances that are removable must be taken out day of surgery.  Do NOT smoke, chew tobacco, or drink alcohol within 24 hours prior to surgery.  Bring medications in original bottles, any inhalers and if applicable your C-PAP/BI-PAP machine  Bring any papers given to you in the doctor’s office  Wear clean, comfortable clothes and socks  Do NOT wear contact lenses or make-up or dark nail polish.  Bring a case for your glasses if applicable.  Bring crutches or walker if applicable  Leave all other valuables and jewelry at home  If you were given a blood bank armband, continue to wear it until discharged.    Preventing a Surgical Site Infection:  Shower the night before surgery (unless instructed otherwise) using a fresh bar of anti-bacterial soap (such as Dial) and clean washcloth.  Dry with a clean towel and dress in clean clothing.  For 2 to 3 days before surgery, avoid shaving with a razor near where you will have surgery because the razor can irritate skin and make it easier to develop an infection.  Ask your surgeon if you will be receiving antibiotics prior to surgery.  Make sure you, your family, and all  healthcare providers clean their hands with soap and water or an alcohol-based hand  before caring for you or your wound.  If at all possible, quit smoking as many days before surgery as you can.    Day of Surgery:  Upon arrival, a pre-op nurse and anesthesiologist will review your health history, obtain vital signs, and answer questions you may have.  The only belongings needed at this time will be your home medications and if applicable you C-PAP/BI-PAP machine.  If you are staying overnight, your family can leave the rest of your belongings in the car and bring them to your room later.  A pre-op nurse will start an IV and you may receive medication in preparation for surgery.  Due to patient privacy and limited space, only one member of your family will be able to accompany you in the pre-op area.  While you are in surgery your family should notify the waiting room  if they leave the waiting room area and provide a contact number.  Please be aware that surgery does come with discomfort.  We want to make every effort to control your discomfort so please discuss any uncontrolled symptoms with your nurse.  Your doctor will most likely have prescribed pain medications.  If you are going home after surgery you will receive individualized written care instructions before being discharged.  A responsible adult must drive you to and from the hospital on the day of surgery and stay with you for 24 hours.  If you are staying overnight following surgery, you will be transported to your hospital room following the recovery period.

## 2024-11-06 ENCOUNTER — TELEPHONE (OUTPATIENT)
Dept: SURGERY | Facility: CLINIC | Age: 34
End: 2024-11-06
Payer: COMMERCIAL

## 2024-11-06 NOTE — TELEPHONE ENCOUNTER
You are scheduled for surgery with Dr. Lerma  on Nov 11 2024 at 10:15.   Do not eat/drink anything after midnight the night prior to surgery, and you must have a  present with you on day of surgery.  An appointment for pre-op has been scheduled for Nov 8 2024 at 9:30. This is a visit with surgical nurses and the anesthesia team to draw blood work and review your medical history and current medications.  Arrive at outpatient surgery on the ground floor of the hospital both of these days. Outpatient surgery is located on the opposite side of the ER location. Follow the arrows for surgery on the [facility] signs posted along the [facility] hill. If you have any questions please call the office at [phone].

## 2024-11-08 ENCOUNTER — PRE-ADMISSION TESTING (OUTPATIENT)
Dept: PREADMISSION TESTING | Facility: HOSPITAL | Age: 34
End: 2024-11-08
Payer: COMMERCIAL

## 2024-11-08 LAB
ANION GAP SERPL CALCULATED.3IONS-SCNC: 13.4 MMOL/L (ref 5–15)
BUN SERPL-MCNC: 12 MG/DL (ref 6–20)
BUN/CREAT SERPL: 15.4 (ref 7–25)
CALCIUM SPEC-SCNC: 8.9 MG/DL (ref 8.6–10.5)
CHLORIDE SERPL-SCNC: 104 MMOL/L (ref 98–107)
CO2 SERPL-SCNC: 16.6 MMOL/L (ref 22–29)
CREAT SERPL-MCNC: 0.78 MG/DL (ref 0.57–1)
DEPRECATED RDW RBC AUTO: 42.1 FL (ref 37–54)
EGFRCR SERPLBLD CKD-EPI 2021: 101.1 ML/MIN/1.73
ERYTHROCYTE [DISTWIDTH] IN BLOOD BY AUTOMATED COUNT: 12.6 % (ref 12.3–15.4)
GLUCOSE SERPL-MCNC: 113 MG/DL (ref 65–99)
HCT VFR BLD AUTO: 42.5 % (ref 34–46.6)
HGB BLD-MCNC: 13.8 G/DL (ref 12–15.9)
MCH RBC QN AUTO: 29.9 PG (ref 26.6–33)
MCHC RBC AUTO-ENTMCNC: 32.5 G/DL (ref 31.5–35.7)
MCV RBC AUTO: 92 FL (ref 79–97)
PLATELET # BLD AUTO: 315 10*3/MM3 (ref 140–450)
PMV BLD AUTO: 9.2 FL (ref 6–12)
POTASSIUM SERPL-SCNC: 4 MMOL/L (ref 3.5–5.2)
RBC # BLD AUTO: 4.62 10*6/MM3 (ref 3.77–5.28)
SODIUM SERPL-SCNC: 134 MMOL/L (ref 136–145)
WBC NRBC COR # BLD AUTO: 8.04 10*3/MM3 (ref 3.4–10.8)

## 2024-11-08 PROCEDURE — 36415 COLL VENOUS BLD VENIPUNCTURE: CPT

## 2024-11-08 PROCEDURE — 85027 COMPLETE CBC AUTOMATED: CPT

## 2024-11-08 PROCEDURE — 80048 BASIC METABOLIC PNL TOTAL CA: CPT

## 2024-11-11 ENCOUNTER — HOSPITAL ENCOUNTER (OUTPATIENT)
Facility: HOSPITAL | Age: 34
Setting detail: HOSPITAL OUTPATIENT SURGERY
Discharge: HOME | End: 2024-11-11
Attending: SURGERY | Admitting: SURGERY
Payer: COMMERCIAL

## 2024-11-11 ENCOUNTER — ANESTHESIA EVENT (OUTPATIENT)
Dept: PERIOP | Facility: HOSPITAL | Age: 34
End: 2024-11-11
Payer: COMMERCIAL

## 2024-11-11 ENCOUNTER — ANESTHESIA (OUTPATIENT)
Dept: PERIOP | Facility: HOSPITAL | Age: 34
End: 2024-11-11
Payer: COMMERCIAL

## 2024-11-11 ENCOUNTER — APPOINTMENT (OUTPATIENT)
Dept: GENERAL RADIOLOGY | Facility: HOSPITAL | Age: 34
End: 2024-11-11
Payer: COMMERCIAL

## 2024-11-11 VITALS
OXYGEN SATURATION: 97 % | HEART RATE: 81 BPM | WEIGHT: 275 LBS | DIASTOLIC BLOOD PRESSURE: 65 MMHG | RESPIRATION RATE: 18 BRPM | TEMPERATURE: 97.9 F | BODY MASS INDEX: 51.92 KG/M2 | HEIGHT: 61 IN | SYSTOLIC BLOOD PRESSURE: 114 MMHG

## 2024-11-11 DIAGNOSIS — R10.11 RIGHT UPPER QUADRANT ABDOMINAL PAIN: ICD-10-CM

## 2024-11-11 DIAGNOSIS — K80.20 CALCULUS OF GALLBLADDER WITHOUT CHOLECYSTITIS WITHOUT OBSTRUCTION: Primary | ICD-10-CM

## 2024-11-11 LAB
B-HCG UR QL: NEGATIVE
EXPIRATION DATE: NORMAL
INTERNAL NEGATIVE CONTROL: NEGATIVE
INTERNAL POSITIVE CONTROL: POSITIVE
Lab: NORMAL

## 2024-11-11 PROCEDURE — 25010000002 GLYCOPYRROLATE 0.4 MG/2ML SOLUTION: Performed by: NURSE ANESTHETIST, CERTIFIED REGISTERED

## 2024-11-11 PROCEDURE — 25010000002 DEXAMETHASONE PER 1 MG: Performed by: NURSE ANESTHETIST, CERTIFIED REGISTERED

## 2024-11-11 PROCEDURE — 81025 URINE PREGNANCY TEST: CPT | Performed by: ANESTHESIOLOGY

## 2024-11-11 PROCEDURE — 25010000002 ONDANSETRON PER 1 MG: Performed by: NURSE ANESTHETIST, CERTIFIED REGISTERED

## 2024-11-11 PROCEDURE — 25010000002 FENTANYL CITRATE (PF) 50 MCG/ML SOLUTION: Performed by: NURSE ANESTHETIST, CERTIFIED REGISTERED

## 2024-11-11 PROCEDURE — 25010000002 NEOSTIGMINE 10 MG/10ML SOLUTION: Performed by: NURSE ANESTHETIST, CERTIFIED REGISTERED

## 2024-11-11 PROCEDURE — 25010000002 BUPRENORPHINE PER 0.1 MG: Performed by: ANESTHESIOLOGY

## 2024-11-11 PROCEDURE — 25010000002 CEFAZOLIN 3 G RECONSTITUTED SOLUTION 1 EACH VIAL

## 2024-11-11 PROCEDURE — 25010000002 PROPOFOL 200 MG/20ML EMULSION: Performed by: NURSE ANESTHETIST, CERTIFIED REGISTERED

## 2024-11-11 PROCEDURE — 25010000002 KETOROLAC TROMETHAMINE PER 15 MG: Performed by: NURSE ANESTHETIST, CERTIFIED REGISTERED

## 2024-11-11 PROCEDURE — 25010000002 MIDAZOLAM PER 1 MG: Performed by: NURSE ANESTHETIST, CERTIFIED REGISTERED

## 2024-11-11 PROCEDURE — 25010000002 LIDOCAINE PF 2% 2 % SOLUTION: Performed by: NURSE ANESTHETIST, CERTIFIED REGISTERED

## 2024-11-11 PROCEDURE — 25010000002 INDOCYANINE GREEN 25 MG RECONSTITUTED SOLUTION

## 2024-11-11 PROCEDURE — 25810000003 LACTATED RINGERS PER 1000 ML: Performed by: ANESTHESIOLOGY

## 2024-11-11 PROCEDURE — S2900 ROBOTIC SURGICAL SYSTEM: HCPCS | Performed by: SURGERY

## 2024-11-11 PROCEDURE — 47562 LAPAROSCOPIC CHOLECYSTECTOMY: CPT | Performed by: SURGERY

## 2024-11-11 PROCEDURE — 25010000002 ROPIVACAINE PER 1 MG: Performed by: ANESTHESIOLOGY

## 2024-11-11 PROCEDURE — 25010000002 MEPERIDINE PER 100 MG: Performed by: NURSE ANESTHETIST, CERTIFIED REGISTERED

## 2024-11-11 DEVICE — HEMOLOK L 6 CLIPS/CART
Type: IMPLANTABLE DEVICE | Site: ABDOMEN | Status: FUNCTIONAL
Brand: WECK

## 2024-11-11 RX ORDER — DEXAMETHASONE SODIUM PHOSPHATE 4 MG/ML
INJECTION, SOLUTION INTRA-ARTICULAR; INTRALESIONAL; INTRAMUSCULAR; INTRAVENOUS; SOFT TISSUE AS NEEDED
Status: DISCONTINUED | OUTPATIENT
Start: 2024-11-11 | End: 2024-11-11 | Stop reason: SURG

## 2024-11-11 RX ORDER — GLYCOPYRROLATE 0.2 MG/ML
INJECTION INTRAMUSCULAR; INTRAVENOUS AS NEEDED
Status: DISCONTINUED | OUTPATIENT
Start: 2024-11-11 | End: 2024-11-11 | Stop reason: SURG

## 2024-11-11 RX ORDER — NEOSTIGMINE METHYLSULFATE 1 MG/ML
INJECTION INTRAVENOUS AS NEEDED
Status: DISCONTINUED | OUTPATIENT
Start: 2024-11-11 | End: 2024-11-11 | Stop reason: SURG

## 2024-11-11 RX ORDER — ROPIVACAINE HYDROCHLORIDE 5 MG/ML
INJECTION, SOLUTION EPIDURAL; INFILTRATION; PERINEURAL
Status: COMPLETED | OUTPATIENT
Start: 2024-11-11 | End: 2024-11-11

## 2024-11-11 RX ORDER — IBUPROFEN 600 MG/1
600 TABLET, FILM COATED ORAL EVERY 6 HOURS PRN
Qty: 20 TABLET | Refills: 0 | Status: SHIPPED | OUTPATIENT
Start: 2024-11-11 | End: 2025-11-11

## 2024-11-11 RX ORDER — SODIUM CHLORIDE, SODIUM LACTATE, POTASSIUM CHLORIDE, CALCIUM CHLORIDE 600; 310; 30; 20 MG/100ML; MG/100ML; MG/100ML; MG/100ML
125 INJECTION, SOLUTION INTRAVENOUS ONCE
Status: COMPLETED | OUTPATIENT
Start: 2024-11-11 | End: 2024-11-11

## 2024-11-11 RX ORDER — ONDANSETRON 2 MG/ML
INJECTION INTRAMUSCULAR; INTRAVENOUS AS NEEDED
Status: DISCONTINUED | OUTPATIENT
Start: 2024-11-11 | End: 2024-11-11 | Stop reason: SURG

## 2024-11-11 RX ORDER — SODIUM CHLORIDE 0.9 % (FLUSH) 0.9 %
10 SYRINGE (ML) INJECTION EVERY 12 HOURS SCHEDULED
Status: DISCONTINUED | OUTPATIENT
Start: 2024-11-11 | End: 2024-11-11 | Stop reason: HOSPADM

## 2024-11-11 RX ORDER — KETOROLAC TROMETHAMINE 30 MG/ML
30 INJECTION, SOLUTION INTRAMUSCULAR; INTRAVENOUS EVERY 6 HOURS PRN
Status: COMPLETED | OUTPATIENT
Start: 2024-11-11 | End: 2024-11-11

## 2024-11-11 RX ORDER — FENTANYL CITRATE 50 UG/ML
50 INJECTION, SOLUTION INTRAMUSCULAR; INTRAVENOUS
Status: DISCONTINUED | OUTPATIENT
Start: 2024-11-11 | End: 2024-11-11 | Stop reason: HOSPADM

## 2024-11-11 RX ORDER — FENTANYL CITRATE 50 UG/ML
INJECTION, SOLUTION INTRAMUSCULAR; INTRAVENOUS AS NEEDED
Status: DISCONTINUED | OUTPATIENT
Start: 2024-11-11 | End: 2024-11-11 | Stop reason: SURG

## 2024-11-11 RX ORDER — LIDOCAINE HYDROCHLORIDE 20 MG/ML
INJECTION, SOLUTION EPIDURAL; INFILTRATION; INTRACAUDAL; PERINEURAL AS NEEDED
Status: DISCONTINUED | OUTPATIENT
Start: 2024-11-11 | End: 2024-11-11 | Stop reason: SURG

## 2024-11-11 RX ORDER — PROPOFOL 10 MG/ML
INJECTION, EMULSION INTRAVENOUS AS NEEDED
Status: DISCONTINUED | OUTPATIENT
Start: 2024-11-11 | End: 2024-11-11 | Stop reason: SURG

## 2024-11-11 RX ORDER — IPRATROPIUM BROMIDE AND ALBUTEROL SULFATE 2.5; .5 MG/3ML; MG/3ML
3 SOLUTION RESPIRATORY (INHALATION) ONCE AS NEEDED
Status: DISCONTINUED | OUTPATIENT
Start: 2024-11-11 | End: 2024-11-11 | Stop reason: HOSPADM

## 2024-11-11 RX ORDER — SODIUM CHLORIDE 9 MG/ML
INJECTION, SOLUTION INTRAVENOUS AS NEEDED
Status: DISCONTINUED | OUTPATIENT
Start: 2024-11-11 | End: 2024-11-11 | Stop reason: HOSPADM

## 2024-11-11 RX ORDER — ONDANSETRON 2 MG/ML
4 INJECTION INTRAMUSCULAR; INTRAVENOUS AS NEEDED
Status: DISCONTINUED | OUTPATIENT
Start: 2024-11-11 | End: 2024-11-11 | Stop reason: HOSPADM

## 2024-11-11 RX ORDER — OXYCODONE AND ACETAMINOPHEN 5; 325 MG/1; MG/1
1 TABLET ORAL ONCE AS NEEDED
Status: COMPLETED | OUTPATIENT
Start: 2024-11-11 | End: 2024-11-11

## 2024-11-11 RX ORDER — DEXAMETHASONE SODIUM PHOSPHATE 4 MG/ML
INJECTION, SOLUTION INTRA-ARTICULAR; INTRALESIONAL; INTRAMUSCULAR; INTRAVENOUS; SOFT TISSUE
Status: COMPLETED | OUTPATIENT
Start: 2024-11-11 | End: 2024-11-11

## 2024-11-11 RX ORDER — ACETAMINOPHEN 500 MG
1000 TABLET ORAL EVERY 6 HOURS
Qty: 40 TABLET | Refills: 0 | Status: SHIPPED | OUTPATIENT
Start: 2024-11-11 | End: 2024-11-16

## 2024-11-11 RX ORDER — SODIUM CHLORIDE 0.9 % (FLUSH) 0.9 %
10 SYRINGE (ML) INJECTION AS NEEDED
Status: DISCONTINUED | OUTPATIENT
Start: 2024-11-11 | End: 2024-11-11 | Stop reason: HOSPADM

## 2024-11-11 RX ORDER — FAMOTIDINE 10 MG/ML
INJECTION, SOLUTION INTRAVENOUS AS NEEDED
Status: DISCONTINUED | OUTPATIENT
Start: 2024-11-11 | End: 2024-11-11 | Stop reason: SURG

## 2024-11-11 RX ORDER — SODIUM CHLORIDE 9 MG/ML
40 INJECTION, SOLUTION INTRAVENOUS AS NEEDED
Status: DISCONTINUED | OUTPATIENT
Start: 2024-11-11 | End: 2024-11-11 | Stop reason: HOSPADM

## 2024-11-11 RX ORDER — MEPERIDINE HYDROCHLORIDE 25 MG/ML
12.5 INJECTION INTRAMUSCULAR; INTRAVENOUS; SUBCUTANEOUS
Status: COMPLETED | OUTPATIENT
Start: 2024-11-11 | End: 2024-11-11

## 2024-11-11 RX ORDER — INDOCYANINE GREEN AND WATER 25 MG
7.5 KIT INJECTION ONCE
Status: COMPLETED | OUTPATIENT
Start: 2024-11-11 | End: 2024-11-11

## 2024-11-11 RX ORDER — ROCURONIUM BROMIDE 10 MG/ML
INJECTION, SOLUTION INTRAVENOUS AS NEEDED
Status: DISCONTINUED | OUTPATIENT
Start: 2024-11-11 | End: 2024-11-11 | Stop reason: SURG

## 2024-11-11 RX ORDER — SODIUM CHLORIDE 0.9 % (FLUSH) 0.9 %
3 SYRINGE (ML) INJECTION EVERY 12 HOURS SCHEDULED
Status: DISCONTINUED | OUTPATIENT
Start: 2024-11-11 | End: 2024-11-11 | Stop reason: HOSPADM

## 2024-11-11 RX ORDER — TRAMADOL HYDROCHLORIDE 50 MG/1
50 TABLET ORAL EVERY 6 HOURS PRN
Qty: 12 TABLET | Refills: 0 | Status: SHIPPED | OUTPATIENT
Start: 2024-11-11 | End: 2025-11-11

## 2024-11-11 RX ORDER — MIDAZOLAM HYDROCHLORIDE 1 MG/ML
INJECTION, SOLUTION INTRAMUSCULAR; INTRAVENOUS AS NEEDED
Status: DISCONTINUED | OUTPATIENT
Start: 2024-11-11 | End: 2024-11-11 | Stop reason: SURG

## 2024-11-11 RX ORDER — MIDAZOLAM HYDROCHLORIDE 1 MG/ML
1 INJECTION, SOLUTION INTRAMUSCULAR; INTRAVENOUS
Status: DISCONTINUED | OUTPATIENT
Start: 2024-11-11 | End: 2024-11-11 | Stop reason: HOSPADM

## 2024-11-11 RX ORDER — BUPRENORPHINE HYDROCHLORIDE 0.32 MG/ML
INJECTION INTRAMUSCULAR; INTRAVENOUS
Status: COMPLETED | OUTPATIENT
Start: 2024-11-11 | End: 2024-11-11

## 2024-11-11 RX ADMIN — DEXAMETHASONE SODIUM PHOSPHATE 4 MG: 4 INJECTION, SOLUTION INTRA-ARTICULAR; INTRALESIONAL; INTRAMUSCULAR; INTRAVENOUS; SOFT TISSUE at 12:32

## 2024-11-11 RX ADMIN — FENTANYL CITRATE 100 MCG: 50 INJECTION INTRAMUSCULAR; INTRAVENOUS at 12:16

## 2024-11-11 RX ADMIN — FENTANYL CITRATE 50 MCG: 50 INJECTION, SOLUTION INTRAMUSCULAR; INTRAVENOUS at 14:37

## 2024-11-11 RX ADMIN — FAMOTIDINE 20 MG: 10 INJECTION, SOLUTION INTRAVENOUS at 12:16

## 2024-11-11 RX ADMIN — FENTANYL CITRATE 50 MCG: 50 INJECTION, SOLUTION INTRAMUSCULAR; INTRAVENOUS at 14:32

## 2024-11-11 RX ADMIN — KETOROLAC TROMETHAMINE 30 MG: 30 INJECTION, SOLUTION INTRAMUSCULAR; INTRAVENOUS at 14:56

## 2024-11-11 RX ADMIN — INDOCYANINE GREEN AND WATER 7.5 MG: KIT at 12:08

## 2024-11-11 RX ADMIN — OXYCODONE HYDROCHLORIDE AND ACETAMINOPHEN 1 TABLET: 5; 325 TABLET ORAL at 14:56

## 2024-11-11 RX ADMIN — LIDOCAINE HYDROCHLORIDE 100 MG: 20 INJECTION, SOLUTION EPIDURAL; INFILTRATION; INTRACAUDAL; PERINEURAL at 12:20

## 2024-11-11 RX ADMIN — BUPRENORPHINE HYDROCHLORIDE 0.3 MG: 0.32 INJECTION INTRAMUSCULAR; INTRAVENOUS at 12:32

## 2024-11-11 RX ADMIN — DEXAMETHASONE SODIUM PHOSPHATE 4 MG: 4 INJECTION, SOLUTION INTRA-ARTICULAR; INTRALESIONAL; INTRAMUSCULAR; INTRAVENOUS; SOFT TISSUE at 12:23

## 2024-11-11 RX ADMIN — PROPOFOL 150 MG: 10 INJECTION, EMULSION INTRAVENOUS at 12:20

## 2024-11-11 RX ADMIN — GLYCOPYRROLATE 0.4 MG: 0.4 INJECTION INTRAMUSCULAR; INTRAVENOUS at 13:55

## 2024-11-11 RX ADMIN — MEPERIDINE HYDROCHLORIDE 12.5 MG: 25 INJECTION INTRAMUSCULAR; INTRAVENOUS; SUBCUTANEOUS at 14:32

## 2024-11-11 RX ADMIN — ROCURONIUM BROMIDE 40 MG: 10 SOLUTION INTRAVENOUS at 12:20

## 2024-11-11 RX ADMIN — SODIUM CHLORIDE 3 G: 9 INJECTION, SOLUTION INTRAVENOUS at 12:16

## 2024-11-11 RX ADMIN — ROPIVACAINE HYDROCHLORIDE 300 MG: 5 INJECTION, SOLUTION EPIDURAL; INFILTRATION; PERINEURAL at 12:32

## 2024-11-11 RX ADMIN — MEPERIDINE HYDROCHLORIDE 12.5 MG: 25 INJECTION INTRAMUSCULAR; INTRAVENOUS; SUBCUTANEOUS at 14:37

## 2024-11-11 RX ADMIN — SODIUM CHLORIDE, POTASSIUM CHLORIDE, SODIUM LACTATE AND CALCIUM CHLORIDE: 600; 310; 30; 20 INJECTION, SOLUTION INTRAVENOUS at 14:09

## 2024-11-11 RX ADMIN — SODIUM CHLORIDE, POTASSIUM CHLORIDE, SODIUM LACTATE AND CALCIUM CHLORIDE: 600; 310; 30; 20 INJECTION, SOLUTION INTRAVENOUS at 12:16

## 2024-11-11 RX ADMIN — NEOSTIGMINE METHYLSULFATE 3 MG: 0.5 INJECTION INTRAVENOUS at 13:55

## 2024-11-11 RX ADMIN — MIDAZOLAM HYDROCHLORIDE 2 MG: 1 INJECTION, SOLUTION INTRAMUSCULAR; INTRAVENOUS at 12:16

## 2024-11-11 RX ADMIN — ONDANSETRON 4 MG: 2 INJECTION INTRAMUSCULAR; INTRAVENOUS at 14:31

## 2024-11-11 RX ADMIN — ONDANSETRON 4 MG: 2 INJECTION INTRAMUSCULAR; INTRAVENOUS at 12:16

## 2024-11-11 NOTE — INTERVAL H&P NOTE
HPI reviewed. 14-point Review of Systems negative except for what is noted in HPI. Physical exam unchanged. Risks and benefits of procedure discussed with the patient, they elect to proceed.

## 2024-11-11 NOTE — ANESTHESIA PROCEDURE NOTES
Airway  Urgency: elective    Date/Time: 11/11/2024 12:22 PM  Airway not difficult    General Information and Staff    Patient location during procedure: OR  CRNA/CAA: Vera Green CRNA    Indications and Patient Condition  Indications for airway management: airway protection    Preoxygenated: yes  MILS maintained throughout  Mask difficulty assessment: 1 - vent by mask    Final Airway Details  Final airway type: endotracheal airway      Successful airway: ETT  Cuffed: yes   Successful intubation technique: direct laryngoscopy and video laryngoscopy  Facilitating devices/methods: intubating stylet  Endotracheal tube insertion site: oral  Blade: Le  Blade size: 3  ETT size (mm): 7.0  Cormack-Lehane Classification: grade I - full view of glottis  Placement verified by: chest auscultation and capnometry   Measured from: lips  ETT/EBT  to lips (cm): 20  Number of attempts at approach: 2  Assessment: lips, teeth, and gum same as pre-op and atraumatic intubation             Hydroquinone Counseling:  Patient advised that medication may result in skin irritation, lightening (hypopigmentation), dryness, and burning.  In the event of skin irritation, the patient was advised to reduce the amount of the drug applied or use it less frequently.  Rarely, spots that are treated with hydroquinone can become darker (pseudoochronosis).  Should this occur, patient instructed to stop medication and call the office. The patient verbalized understanding of the proper use and possible adverse effects of hydroquinone.  All of the patient's questions and concerns were addressed.

## 2024-11-11 NOTE — ANESTHESIA PROCEDURE NOTES
"Peripheral Block      Patient reassessed immediately prior to procedure    Patient location during procedure: OR  Start time: 11/11/2024 12:31 PM  Stop time: 11/11/2024 12:32 PM  Reason for block: at surgeon's request and post-op pain management  Performed by  CRNA/CAA: Vera Green CRNA  Preanesthetic Checklist  Completed: patient identified, IV checked, site marked, risks and benefits discussed, surgical consent, monitors and equipment checked, pre-op evaluation and timeout performed  Prep:  Pt Position: supine  Sterile barriers:cap, gloves, sterile barriers and mask  Prep: ChloraPrep  Patient monitoring: blood pressure monitoring, continuous pulse oximetry and EKG  Procedure    Nursing cardiac assessment comments yes: Sedation, GA, Spinal,Epidural   Performed under: general  Guidance:ultrasound guided    ULTRASOUND INTERPRETATION.  Using ultrasound guidance a 20 G (20g 4\" Stimuplex) gauge needle was placed in close proximity to the nerve, at which point, under ultrasound guidance anesthetic was injected in the area of the nerve and spread of the anesthesia was seen on ultrasound in close proximity thereto.  There were no abnormalities seen on ultrasound; a digital image was taken; and the patient tolerated the procedure with no complications. Images:still images obtained    Laterality:Bilateral  Block Type:TAP  Injection Technique:single-shot  Needle Type:short-bevel  Needle Gauge:20 G  Resistance on Injection: none    Medications Used: buprenorphine (BUPRENEX) injection - Peripheral Nerve, Transversus Abdominus Plane   0.3 mg - 11/11/2024 12:32:00 PM  dexamethasone (DECADRON) injection - Peripheral Nerve, Transversus Abdominus Plane   4 mg - 11/11/2024 12:32:00 PM  ropivacaine (NAROPIN) injection 0.5 % - Peripheral Nerve, Transversus Abdominus Plane   300 mg - 11/11/2024 12:32:00 PM      Medications  Comment:Block Injection:  Total volume divided equally between all 4 injection sites      Post " Assessment  Injection Assessment: negative aspiration for heme, incremental injection and no paresthesia on injection  Patient Tolerance:comfortable throughout block  Complications:no  Additional Notes  The pt was in the supine position under general anesthesia    Under Ultrasound guidance, a BBraun 4inch 360 degree needle was advanced with Normal Saline hydro dissection of tissue.  The Internal Oblique and Transversus Abdominus muscles where visualized.  At or before the aponeurosis of Internal Oblique, local anesthetic spread was visualized in the Transversus Abdominus Plane. Injection was made incrementally with aspiration every 5 mls.  There was no  intravascular injection,  injection pressure was normal, there was no neural injection, and the procedure was completed without difficulty. The same procedure was completed for left and right sided lateral tap blocks.    Under Ultrasound guidance, a Ralph 4inch 360 degree needle was advanced with Normal Saline hydro dissection of tissue.  The Rectus and Transversus Abdominus muscles where visualized.  The needle tip was placed between the Transversus Abdominus and rectus abdominus, local anesthetic spread was visualized in the Transversus Abdominus Plane. Injection was made incrementally with aspiration every 5 mls.  There was no  intravascular injection,  injection pressure was normal, there was no neural injection, and the procedure was completed without difficulty. The same procedure was completed for left and right sided subcostal tap blocks. Thank You.      Performed by: Vera Green CRNA

## 2024-11-11 NOTE — ANESTHESIA POSTPROCEDURE EVALUATION
Patient: Chely Vicente    Procedure Summary       Date: 11/11/24 Room / Location: [facility] OR  /  [facility] OR    Anesthesia Start: 1216 Anesthesia Stop: 1411    Procedure: CHOLECYSTECTOMY LAPAROSCOPIC WITH DAVINCI ROBOT (Abdomen) Diagnosis:       Calculus of gallbladder without cholecystitis without obstruction      Right upper quadrant abdominal pain      (Calculus of gallbladder without cholecystitis without obstruction [K80.20])      (Right upper quadrant abdominal pain [R10.11])    Surgeons: Victoriano Lerma MD Provider: Angel Brush MD    Anesthesia Type: general ASA Status: 3            Anesthesia Type: general    Vitals  Vitals Value Taken Time   /61 11/11/24 1447   Temp 98.1 °F (36.7 °C) 11/11/24 1412   Pulse 75 11/11/24 1447   Resp 20 11/11/24 1447   SpO2 97 % 11/11/24 1447           Post Anesthesia Care and Evaluation    Patient location during evaluation: PACU  Patient participation: complete - patient participated  Level of consciousness: awake  Pain score: 0  Pain management: satisfactory to patient    Airway patency: patent  Anesthetic complications: No anesthetic complications  PONV Status: none  Cardiovascular status: hemodynamically stable  Respiratory status: nasal cannula  Hydration status: acceptable

## 2024-11-11 NOTE — ANESTHESIA PREPROCEDURE EVALUATION
Anesthesia Evaluation     Patient summary reviewed and Nursing notes reviewed   no history of anesthetic complications:   NPO Solid Status: > 8 hours  NPO Liquid Status: > 8 hours           Airway   Mallampati: I  TM distance: >3 FB  Neck ROM: full  No difficulty expected  Dental    (+) edentulous    Pulmonary - normal exam    breath sounds clear to auscultation  (+) a smoker vape, asthma,  Cardiovascular   Exercise tolerance: good (4-7 METS)    ECG reviewed  Rhythm: regular  Rate: normal    (+) hypertension      Neuro/Psych- negative ROS  GI/Hepatic/Renal/Endo    (+) hepatitis C, liver disease    Musculoskeletal     Abdominal  - normal exam    Abdomen: soft.   Substance History - negative use     OB/GYN negative ob/gyn ROS         Other   arthritis,     ROS/Med Hx Other:   Sinus tachycardia  Low voltage QRS  Borderline ECG  No previous ECGs available  Confirmed by Chandler Hendrickson ([id number]) on 10/18/2023 9:15:03 AM                Anesthesia Plan    ASA 3     general     intravenous induction     Anesthetic plan, risks, benefits, and alternatives have been provided, discussed and informed consent has been obtained with: patient.    CODE STATUS:

## 2024-11-11 NOTE — OP NOTE
Operative Report    Patient Name:  Chely Vicente  YOB: 1990  [id number]  11/11/2024      PREOPERATIVE DIAGNOSIS: Symptomatic cholelithiasis      POSTOPERATIVE DIAGNOSIS: Acute on chronic cholecystitis       PROCEDURE PERFORMED:     Robotic assisted laparoscopic cholecystectomy        SURGEON: Victoriano Lerma MD         SPECIMENS: Gallbladder and contents        ANESTHESIA: General. TAP block        FINDINGS:     1. Gallbladder only partially fills with ICG. Large stone impacted in infundibulum.  Significant inflammation in the hepatocystic triangle.  Short cystic duct.  Prominent common bile duct           INDICATIONS:      The patient is a 34 y.o. female with a history of right upper quadrant pain and imaging with cholelithiasis. The risks and benefits of robotic assisted laparoscopic cholecystectomy with possible cholangiography were discussed with the patient and their family and they agree to proceed.        DESCRIPTION OF PROCEDURE:    After obtaining informed consent, the patient was taken to the operating room and placed in the supine position. After appropriate DVT and antibiotic prophylaxis, general anesthesia was induced. The abdomen was prepped and draped in standard sterile fashion.  A proper timeout was performed  Abdomen was entered in the left hemiabdomen utilizing an 8 mm robotic trocar gasless.  The abdomen was insufflated to 15 mmHg.              Additional 8 mm robotic trocars were placed in the left paramedian, right paramedian and right lateral hemiabdomen, just above the level of the umbilicus.   All under direct laparoscopic visualization.  Robot was docked.                 The gallbladder was grasped and elevated cephalad.  The gallbladder wall was edematous and there was pericholecystic fluid.  Medial and lateral peritoneotomy was created with cautery.  There was a stone impacted within the infundibulum which was milked higher within the gallbladder.  There was significant  "inflammation within the hepatocystic triangle making dissection difficult.  Using meticulous electrocautery dissection , the cystic duct and cystic artery were bluntly dissected free of other structures and clearly identified using the \"Critical View\" technique with only two structures entering the gallbladder, hepatocystic triangle cleared of fat and fibrous tissue, lower third of posterior gallbladder wall dissected off of the cystic plate.  The cystic duct was short and the common bile duct prominence.  The cystic artery was then clipped once proximally and divided with cautery.              The cystic duct was then clipped twice distally and once at the junction of the duct and infundibulum, followed by transection with scissors.                            The gallbladder was then dissected free of the gallbladder fossa using a combination of electrocautery and blunt dissection. The gallbladder was then placed in an Endo Catch bag, and removed from the left paramedian abdominal trocar.  The fascia of this trocar site was then closed with a figure-of-eight 0 Vicryl suture utilizing a Ramírez Ikm device.                 The right upper quadrant was then inspected. The cystic duct and cystic artery stumps were intact without bleeding or biliary leak.  Remaining trocars were removed. The wounds were closed in using absorbable subcuticular suture.  Skin Affix placed on the incisions. The patient recovered from anesthesia, was extubated in the operating room, and transferred to the PACU in stable condition.  All sponge and needle counts were correct times two at the completion of the procedure. There were no immediate complications.      Estimated blood loss: 15 cc    Victoriano Lerma MD  11/11/2024  14:03 EST   "

## 2024-11-14 LAB — REF LAB TEST METHOD: NORMAL

## 2024-11-19 ENCOUNTER — OFFICE VISIT (OUTPATIENT)
Dept: SURGERY | Facility: CLINIC | Age: 34
End: 2024-11-19
Payer: COMMERCIAL

## 2024-11-19 VITALS — HEIGHT: 61 IN | BODY MASS INDEX: 51.92 KG/M2 | WEIGHT: 275 LBS

## 2024-11-19 DIAGNOSIS — R10.9 ABDOMINAL PAIN, UNSPECIFIED ABDOMINAL LOCATION: Primary | ICD-10-CM

## 2024-11-19 PROCEDURE — 1159F MED LIST DOCD IN RCRD: CPT

## 2024-11-19 PROCEDURE — 1160F RVW MEDS BY RX/DR IN RCRD: CPT

## 2024-11-19 PROCEDURE — 99024 POSTOP FOLLOW-UP VISIT: CPT

## 2024-11-19 NOTE — PROGRESS NOTES
Mariel is a 34-year-old female who presents for evaluation for follow-up status post robotic cholecystectomy with Dr. Lerma on 11/11.  She reports she is done well in the postop period.  However, she states that she is having some pain around her left periumbilical incision.  Reports all other incisions are pain-free.  Does state that she did lift her daughter who is greater than 10 to 15 pounds.    All incisions are clean, dry and intact.  Patient has some periumbilical pain around left abdominal incision.  No abnormalities palpated.  No signs of infection.    Did discuss pathology with patient on this date.    Reminded of lift restriction.  We discussed ordering a CT of abdomen to rule out any acute abnormalities.  Patient verbalized understanding.  I will track results and call patient.

## 2025-07-15 ENCOUNTER — HOSPITAL ENCOUNTER (EMERGENCY)
Facility: HOSPITAL | Age: 35
Discharge: LEFT BEFORE DISCHARGE | End: 2025-07-15
Payer: COMMERCIAL

## 2025-07-15 VITALS
SYSTOLIC BLOOD PRESSURE: 139 MMHG | HEIGHT: 60 IN | RESPIRATION RATE: 17 BRPM | TEMPERATURE: 98.5 F | BODY MASS INDEX: 48.1 KG/M2 | WEIGHT: 245 LBS | DIASTOLIC BLOOD PRESSURE: 100 MMHG | OXYGEN SATURATION: 97 % | HEART RATE: 92 BPM

## 2025-07-15 PROCEDURE — 99211 OFF/OP EST MAY X REQ PHY/QHP: CPT

## 2025-07-15 NOTE — ED NOTES
Patient declines to wait for Triage Nurse to print LWBS after Triage documentation. Notified Provider/Lead RN. No iv access established during this visit.

## 2025-07-20 ENCOUNTER — HOSPITAL ENCOUNTER (EMERGENCY)
Facility: HOSPITAL | Age: 35
Discharge: LEFT BEFORE DISCHARGE | End: 2025-07-20
Payer: COMMERCIAL

## 2025-07-20 VITALS
WEIGHT: 235 LBS | HEIGHT: 60 IN | DIASTOLIC BLOOD PRESSURE: 52 MMHG | BODY MASS INDEX: 46.13 KG/M2 | HEART RATE: 83 BPM | TEMPERATURE: 98.3 F | SYSTOLIC BLOOD PRESSURE: 96 MMHG | OXYGEN SATURATION: 97 % | RESPIRATION RATE: 16 BRPM

## 2025-07-20 PROCEDURE — 99211 OFF/OP EST MAY X REQ PHY/QHP: CPT

## (undated) DEVICE — 2, DISPOSABLE SUCTION/IRRIGATOR WITH DISPOSABLE TIP: Brand: STRYKEFLOW

## (undated) DEVICE — UNDERGLV SURG BIOGEL INDICAT PF 8 GRN

## (undated) DEVICE — PAD GRND REM POLYHESIVE A/ DISP

## (undated) DEVICE — CVR DISP HUG U VAC STEEP TREND

## (undated) DEVICE — PK LAP GEN 70

## (undated) DEVICE — PERMANENT CAUTERY HOOK: Brand: ENDOWRIST

## (undated) DEVICE — GLV SURG PREMIERPRO MIC LTX PF SZ8 BRN

## (undated) DEVICE — ARM DRAPE

## (undated) DEVICE — GLV SURG PREMIERPRO MIC LTX PF SZ7.5 BRN

## (undated) DEVICE — 40595 XL TRENDELENBURG POSITIONING KIT: Brand: 40595 XL TRENDELENBURG POSITIONING KIT

## (undated) DEVICE — PAD POSTN ARMBND 1P/U

## (undated) DEVICE — LARGE HEM-O-LOK CLIP APPLIER: Brand: ENDOWRIST

## (undated) DEVICE — BLADELESS OBTURATOR: Brand: WECK VISTA

## (undated) DEVICE — Device

## (undated) DEVICE — TISSUE RETRIEVAL SYSTEM: Brand: INZII RETRIEVAL SYSTEM

## (undated) DEVICE — SCISS ENDOWRIST HOTSHEARS DAVINCI/X/XI CRV REUS

## (undated) DEVICE — CANNULA SEAL

## (undated) DEVICE — SUT MNCRYL 4/0 PS2 18 IN

## (undated) DEVICE — GRSPR TRAIN ENDOWRIST PROGRASP DAVINCI/X/XI

## (undated) DEVICE — TIP COVER ACCESSORY

## (undated) DEVICE — SUT VIC 0/0 UR6 27IN DYED J603H

## (undated) DEVICE — SHEAR HARM ENDOWRIST DAVINCI/X/XI BXLR FEN 8 REUS

## (undated) DEVICE — APPL COTN TP PLSTC 6IN STRL LF PK/2

## (undated) DEVICE — COVER,MAYO STAND,STERILE: Brand: MEDLINE